# Patient Record
Sex: FEMALE | Race: WHITE
[De-identification: names, ages, dates, MRNs, and addresses within clinical notes are randomized per-mention and may not be internally consistent; named-entity substitution may affect disease eponyms.]

---

## 2017-08-25 ENCOUNTER — HOSPITAL ENCOUNTER (OUTPATIENT)
Dept: HOSPITAL 74 - JER | Age: 57
Setting detail: OBSERVATION
LOS: 2 days | Discharge: HOME | End: 2017-08-27
Attending: INTERNAL MEDICINE | Admitting: INTERNAL MEDICINE
Payer: COMMERCIAL

## 2017-08-25 VITALS — BODY MASS INDEX: 32.2 KG/M2

## 2017-08-25 DIAGNOSIS — E78.5: ICD-10-CM

## 2017-08-25 DIAGNOSIS — D72.829: ICD-10-CM

## 2017-08-25 DIAGNOSIS — K21.9: ICD-10-CM

## 2017-08-25 DIAGNOSIS — Z87.19: ICD-10-CM

## 2017-08-25 DIAGNOSIS — N11.1: Primary | ICD-10-CM

## 2017-08-25 LAB
ALBUMIN SERPL-MCNC: 3.9 G/DL (ref 3.4–5)
ALP SERPL-CCNC: 138 U/L (ref 45–117)
ALT SERPL-CCNC: 43 U/L (ref 12–78)
ANION GAP SERPL CALC-SCNC: 7 MMOL/L (ref 8–16)
APPEARANCE UR: (no result)
AST SERPL-CCNC: 24 U/L (ref 15–37)
BASOPHILS # BLD: 0.1 % (ref 0–2)
BILIRUB SERPL-MCNC: 0.9 MG/DL (ref 0.2–1)
BILIRUB UR STRIP.AUTO-MCNC: NEGATIVE MG/DL
CALCIUM SERPL-MCNC: 9.4 MG/DL (ref 8.5–10.1)
CAOX CRY URNS QL MICRO: (no result) /HPF
CO2 SERPL-SCNC: 27 MMOL/L (ref 21–32)
COLOR UR: YELLOW
CREAT SERPL-MCNC: 0.7 MG/DL (ref 0.55–1.02)
DEPRECATED RDW RBC AUTO: 14.3 % (ref 11.6–15.6)
EOSINOPHIL # BLD: 1 % (ref 0–4.5)
GLUCOSE SERPL-MCNC: 107 MG/DL (ref 74–106)
HYALINE CASTS URNS QL MICRO: 6 /LPF
KETONES UR QL STRIP: NEGATIVE
LEUKOCYTE ESTERASE UR QL STRIP.AUTO: (no result)
MCH RBC QN AUTO: 26.9 PG (ref 25.7–33.7)
MCHC RBC AUTO-ENTMCNC: 32.9 G/DL (ref 32–36)
MCV RBC: 81.7 FL (ref 80–96)
MUCOUS THREADS URNS QL MICRO: (no result)
NEUTROPHILS # BLD: 87.1 % (ref 42.8–82.8)
NITRITE UR QL STRIP: NEGATIVE
PH UR: 5 [PH] (ref 5–8)
PLATELET # BLD AUTO: 251 K/MM3 (ref 134–434)
PMV BLD: 8.5 FL (ref 7.5–11.1)
PROT SERPL-MCNC: 7.3 G/DL (ref 6.4–8.2)
PROT UR QL STRIP: NEGATIVE
PROT UR QL STRIP: NEGATIVE
RBC # BLD AUTO: (no result) /HPF (ref 0–3)
RBC # UR STRIP: NEGATIVE /UL
SP GR UR: 1.02 (ref 1–1.02)
UROBILINOGEN UR STRIP-MCNC: NEGATIVE MG/DL (ref 0.2–1)
WBC # BLD AUTO: 18.7 K/MM3 (ref 4–10)
WBC # UR AUTO: 31 /HPF (ref 3–5)

## 2017-08-25 PROCEDURE — G0378 HOSPITAL OBSERVATION PER HR: HCPCS

## 2017-08-25 RX ADMIN — SODIUM CHLORIDE STA MLS/HR: 9 INJECTION, SOLUTION INTRAVENOUS at 21:52

## 2017-08-25 NOTE — PDOC
History of Present Illness





- General


Chief Complaint: Pain


Stated Complaint: PAIN, ACUTE


Time Seen by Provider: 08/25/17 19:34


History Source: Patient


Exam Limitations: No Limitations





- History of Present Illness


Initial Comments: 





08/25/17 23:55


08/21/2017: Colonoscopy





56-year-old female presents to the emergency department complaining of 4/10 

dull intermittent left-sided flank pain radiating to the left groin 6 hours 

without nausea/vomiting, fever/chills, chest pain, shortness of breath, 

abdominal pains, urinary symptoms: Frequency/urgency/hesitancy, hematuria. 

There are no alleviating or exacerbating factors.


Timing/Duration: 4-6 hours





Past History





- Past Medical History


Allergies/Adverse Reactions: 


 Allergies











Allergy/AdvReac Type Severity Reaction Status Date / Time


 


No Known Allergies Allergy   Verified 04/23/16 12:49











Home Medications: 


Ambulatory Orders





Atorvastatin Ca [Lipitor] 40 mg PO HS 05/12/14 


Albuterol Sulfate Inhaler - [Ventolin HFA Inhaler -] 1 - 2 inh PO QID PRN 09/30/ 14 


Linaclotide [Linzess] 72 mcg PO DAILY 08/25/17 


Metronidazole 250 mg PO TID 08/25/17 


Ranitidine HCl 300 mg PO DAILY 08/25/17 








Anemia: No


Asthma: Yes (mild)


Cancer: No


Cardiac Disorders: No


CVA: No


COPD: No


CHF: No


Dementia: No


Diabetes: No


GI Disorders: Yes (acid reflux, Irritable Bowel Syndrome)


 Disorders: No


HTN: No


Hypercholesterolemia: Yes


Liver Disease: No


Seizures: No


Thyroid Disease: No





- Surgical History


Abdominal Surgery: No


Appendectomy: No


Cardiac Surgery: No


Cholecystectomy: No


Gastric Stapling: No


Lung Surgery: No


Neurologic Surgery: No


Orthopedic Surgery: No





- Psycho/Social/Smoking Cessation Hx


Anxiety: No


Suicidal Ideation: No


Smoking History: Never smoked


Have you smoked in the past 12 months: No


If you are a former smoker, when did you quit?: 2010


Information on smoking cessation initiated: No


Hx Alcohol Use: No


Drug/Substance Use Hx: No


Substance Use Type: None


Hx Substance Use Treatment: No





*Physical Exam





- Vital Signs


 Last Vital Signs











Temp Pulse Resp BP Pulse Ox


 


 98.0 F   78   18   140/74   99 


 


 08/25/17 19:23  08/25/17 19:23  08/25/17 19:23  08/25/17 19:23  08/25/17 19:23














ED Treatment Course





- LABORATORY


CBC & Chemistry Diagram: 


 08/25/17 19:40





 08/25/17 19:40





*DC/Admit/Observation/Transfer


Diagnosis at time of Disposition: 


 Pyelonephritis





- Discharge Dispostion


Condition at time of disposition: Guarded


Admit: Yes





- Referrals


Referrals: 


Kenji Low MD [Primary Care Provider] -

## 2017-08-25 NOTE — PDOC
*Physical Exam





- Vital Signs


 Last Vital Signs











Temp Pulse Resp BP Pulse Ox


 


 98.0 F   78   18   140/74   99 


 


 08/25/17 19:23  08/25/17 19:23  08/25/17 19:23  08/25/17 19:23  08/25/17 19:23














ED Treatment Course





- LABORATORY


CBC & Chemistry Diagram: 


 08/26/17 09:57





 08/26/17 09:57





- ADDITIONAL ORDERS


Additional order review: 


 Laboratory  Results











  08/25/17 08/25/17





  21:20 19:40


 


Sodium   137


 


Potassium   3.7


 


Chloride   103


 


Carbon Dioxide   27


 


Anion Gap   7 L


 


BUN   18  D


 


Creatinine   0.7


 


Creat Clearance w eGFR   > 60


 


Random Glucose   107 H D


 


Calcium   9.4


 


Total Bilirubin   0.9  D


 


AST   24


 


ALT   43


 


Alkaline Phosphatase   138 H


 


Total Protein   7.3


 


Albumin   3.9


 


Urine Color  Yellow 


 


Urine Appearance  Slcloudy 


 


Urine pH  5.0 


 


Urine Protein  Negative 


 


Urine Glucose (UA)  Negative 


 


Urine Ketones  Negative 


 


Urine Blood  Negative 


 


Urine Nitrite  Negative 


 


Urine Bilirubin  Negative 


 


Urine Urobilinogen  Negative 


 


Ur Leukocyte Esterase  Trace 


 


Urine RBC  None 


 


Urine WBC  31 


 


Calcium Oxalate Crystal  Moderate 


 


Hyaline Casts  6 


 


Urine Mucus  Many 








 











  08/25/17





  19:40


 


RBC  4.80


 


MCV  81.7


 


MCHC  32.9


 


RDW  14.3


 


MPV  8.5


 


Neutrophils %  87.1 H D


 


Lymphocytes %  6.9 L D


 


Monocytes %  4.9


 


Eosinophils %  1.0


 


Basophils %  0.1














- Medications


Given in the ED: 


ED Medications














Discontinued Medications














Generic Name Dose Route Start Last Admin





  Trade Name Freq  PRN Reason Stop Dose Admin


 


Sodium Chloride  1,000 mls @ 1,000 mls/hr 08/25/17 20:40 08/25/17 21:52





  Normal Saline -  IV 08/25/17 21:39  1,000 mls/hr





  ASDIR STA   Administration














Medical Decision Making





- Medical Decision Making





08/25/17 22:30





Pt seen by the Advanced Practice Provider under my direct supervision


Ancillary studies reviewed





I agree with plan as outlined by the Advanced Practice Provider ARACELI Arenas





*DC/Admit/Observation/Transfer


Diagnosis at time of Disposition: 


 Pyelonephritis





- Discharge Dispostion


Condition at time of disposition: Guarded

## 2017-08-26 LAB
ANION GAP SERPL CALC-SCNC: 8 MMOL/L (ref 8–16)
BASOPHILS # BLD: 2.6 % (ref 0–2)
CALCIUM SERPL-MCNC: 9 MG/DL (ref 8.5–10.1)
CO2 SERPL-SCNC: 27 MMOL/L (ref 21–32)
CREAT SERPL-MCNC: 0.6 MG/DL (ref 0.55–1.02)
DEPRECATED RDW RBC AUTO: 14.4 % (ref 11.6–15.6)
EOSINOPHIL # BLD: 2.5 % (ref 0–4.5)
GLUCOSE SERPL-MCNC: 108 MG/DL (ref 74–106)
MCH RBC QN AUTO: 26.9 PG (ref 25.7–33.7)
MCHC RBC AUTO-ENTMCNC: 33.3 G/DL (ref 32–36)
MCV RBC: 81 FL (ref 80–96)
NEUTROPHILS # BLD: 76.2 % (ref 42.8–82.8)
PLATELET # BLD AUTO: 255 K/MM3 (ref 134–434)
PMV BLD: 8.1 FL (ref 7.5–11.1)
WBC # BLD AUTO: 10.3 K/MM3 (ref 4–10)

## 2017-08-26 PROCEDURE — 3E03329 INTRODUCTION OF OTHER ANTI-INFECTIVE INTO PERIPHERAL VEIN, PERCUTANEOUS APPROACH: ICD-10-PCS | Performed by: INTERNAL MEDICINE

## 2017-08-26 PROCEDURE — 3E0337Z INTRODUCTION OF ELECTROLYTIC AND WATER BALANCE SUBSTANCE INTO PERIPHERAL VEIN, PERCUTANEOUS APPROACH: ICD-10-PCS | Performed by: INTERNAL MEDICINE

## 2017-08-26 RX ADMIN — CEFTRIAXONE SCH MLS/HR: 1 INJECTION, POWDER, FOR SOLUTION INTRAMUSCULAR; INTRAVENOUS at 19:58

## 2017-08-26 RX ADMIN — RANITIDINE SCH MG: 150 TABLET ORAL at 09:06

## 2017-08-26 RX ADMIN — Medication SCH MCG: at 11:21

## 2017-08-26 NOTE — HP
CHIEF COMPLAINT: "My left side is hurting."





PCP: [facility]





HISTORY OF PRESENT ILLNESS: This is a 55yo woman presents to the emergency 

department complaining of 4/10 dull intermittent left-sided flank pain 

radiating to the left groin 6 hours with frequency and urgency without nausea/

vomiting, fever/chills, chest pain, shortness of breath, abdominal pains, 

urinary symptoms: hesitancy, hematuria. There are no alleviating or 

exacerbating factors.








ER course was notable for:


(1) pyuria


(2) Leukocytosis








Recent Travel: denies





PAST MEDICAL HISTORY: IBS, GERD, HLD





PAST SURGICAL HISTORY: denies





Social History:


Smoking: former- quit 5-6 years ago. 20 pack years prior


Alcohol: occasional


Drugs: occasional THC





Family History: father and mother  of "heart failure" 1 brother and 1 

sister alive and healthy


Allergies





No Known Allergies Allergy (Verified 16 12:49)


 








HOME MEDICATIONS:


 Home Medications











 Medication  Instructions  Recorded


 


Atorvastatin Ca [Lipitor] 40 mg PO HS 14


 


Albuterol Sulfate Inhaler - 1 - 2 inh PO QID PRN 14





[Ventolin HFA Inhaler -]  


 


Linaclotide [Linzess] 72 mcg PO DAILY 17


 


Metronidazole 250 mg PO TID 17


 


Ranitidine HCl 300 mg PO DAILY 17








REVIEW OF SYSTEMS


CONSTITUTIONAL: 


Absent:  fever, chills, diaphoresis, generalized weakness, malaise, loss of 

appetite, weight change


HEENT: 


Absent:  rhinorrhea, nasal congestion, throat pain, throat swelling, difficulty 

swallowing, mouth swelling, ear pain, eye pain, visual changes


CARDIOVASCULAR: 


Absent: chest pain, syncope, palpitations, irregular heart rate, lightheadedness

, peripheral edema


RESPIRATORY: 


Absent: cough, shortness of breath, dyspnea with exertion, orthopnea, wheezing, 

stridor, hemoptysis


GASTROINTESTINAL:


Absent: abdominal pain, abdominal distension, nausea, vomiting, diarrhea, 

constipation, melena, hematochezia


GENITOURINARY: Present- frequency, urgency, left flank pain


Absent: dysuria, hesitancy, hematuria, genital pain


MUSCULOSKELETAL: 


Absent: myalgia, arthralgia, joint swelling, back pain, neck pain


SKIN: 


Absent: rash, itching, pallor


HEMATOLOGIC/IMMUNOLOGIC: 


Absent: easy bleeding, easy bruising, lymphadenopathy, frequent infections


ENDOCRINE:


Absent: unexplained weight gain, unexplained weight loss, heat intolerance, 

cold intolerance


NEUROLOGIC: 


Absent: headache, focal weakness or paresthesias, dizziness, unsteady gait, 

seizure, mental status changes, bladder or bowel incontinence


PSYCHIATRIC: 


Absent: anxiety, depression, suicidal or homicidal ideation, hallucinations.








PHYSICAL EXAMINATION


 Vital Signs - 24 hr











  17





  01:59


 


Temperature 98.1 F


 


Pulse Rate [ 71





Apical] 


 


Respiratory 18





Rate 


 


Blood Pressure 151/76





[Right Arm] 


 


O2 Sat by Pulse 99





Oximetry (%) 











GENERAL: Awake, alert, and fully oriented, in no acute distress.


HEAD: Normal with no signs of trauma.


EYES: Pupils equal, round and reactive to light, extraocular movements intact, 

sclera anicteric, conjunctiva clear. No lid lag.


EARS, NOSE, THROAT: Ears normal, nares patent, oropharynx clear without 

exudates. Moist mucous membranes.


NECK: Normal range of motion, supple without lymphadenopathy, JVD, or masses.


LUNGS: Breath sounds equal, clear to auscultation bilaterally. No wheezes, and 

no crackles. No accessory muscle use.


HEART: Regular rate and rhythm, normal S1 and S2 without murmur, rub or gallop.


ABDOMEN: Soft, nontender, not distended, normoactive bowel sounds, no guarding, 

no rebound, no masses.  No hepatomegaly or splenomegaly. 


MUSCULOSKELETAL: Normal range of motion at all joints. No bony deformities or 

tenderness. Left CVA tenderness present; right absent.


UPPER EXTREMITIES: 2+ pulses, warm, well-perfused. No cyanosis. No clubbing. No 

peripheral edema.


LOWER EXTREMITIES: 2+ pulses, warm, well-perfused. No calf tenderness. No 

peripheral edema. 


NEUROLOGICAL:  Cranial nerves II-XII intact. Normal speech. Normal gait.


PSYCHIATRIC: Cooperative. Good eye contact. Appropriate mood and affect.


SKIN: Warm, dry, normal turgor, no rashes or lesions noted, normal capillary 

refill. 





ASSESSMENT/PLAN:


A:


55 yo woman with pyelonephritis as evidenced by pyuria, flank pain and 

leukocytosis.





P:


1. Pyelonephritis


 - Rocephin 1g daily


 - daily CBC


 - urine cx pending





2. Leukocytosis


 - likely from pyelo


 - trend WBC





3. HLD


 - lipitor 40mg





4. IBS


 - Linzess


 - Flagyl 250mg po tid





5. GERD


 - Zantac 300mg daily





6. F/E/N


 - regular diet


 - encourage PO fluids


 - replete prn





7. PPX


 - OOB





Dispo- requires observation of her acute medical condition











Problem List





- Problem


(1) Pyelonephritis


Code(s): N12 - TUBULO-INTERSTITIAL NEPHRITIS, NOT SPCF AS ACUTE OR CHRONIC





(2) History of IBS


Code(s): Z87.19 - PERSONAL HISTORY OF OTHER DISEASES OF THE DIGESTIVE SYSTEM





(3) HLD (hyperlipidemia)


Code(s): E78.5 - HYPERLIPIDEMIA, UNSPECIFIED   





(4) Chronic GERD


Code(s): K21.9 - GASTRO-ESOPHAGEAL REFLUX DISEASE WITHOUT ESOPHAGITIS








Visit type





- Emergency Visit


Emergency Visit: Yes


ED Registration Date: 17


Care time: The patient presented to the Emergency Department on the above date 

and was hospitalized for further evaluation of their emergent condition.





- New Patient


This patient is new to me today: Yes


Date on this admission: 17





- Critical Care


Critical Care patient: No

## 2017-08-26 NOTE — PN
Progress Note (short form)





- Note


Progress Note: 


55 y/o female admitted for left sided flank pain and urgency and diagnosed with 

pyelonephritis





Exam:


GEN: A&Ox3 in NAD, afebrile


HEENT: NC, PERRLA, EOMI, oral mucosa moist and no lesions


CVS:RRR, S1, S2


LUNGS: CTA, no wheezing


ABD: Soft, NT, ND, BS+ , no organomegaly No CVA tenderness.


Ext:Nl ROM, no Edema


Neuro: CN2-12 intact








A/P:


 Pyelonephritis


IVF


continue ABx rocephine


Repeat CBC, BMP and lactic acid stat.


WBC trending down.





IBS continue home medications





Problem List





- Problems


(1) Pyelonephritis


Code(s): N12 - TUBULO-INTERSTITIAL NEPHRITIS, NOT SPCF AS ACUTE OR CHRONIC





(2) History of IBS


Code(s): Z87.19 - PERSONAL HISTORY OF OTHER DISEASES OF THE DIGESTIVE SYSTEM








Visit type





- Emergency Visit


Emergency Visit: Yes


ED Registration Date: 08/26/17


Care time: The patient presented to the Emergency Department on the above date 

and was hospitalized for further evaluation of their emergent condition.





- New Patient


This patient is new to me today: Yes


Date on this admission: 08/26/17





- Critical Care


Critical Care patient: No

## 2017-08-27 VITALS — SYSTOLIC BLOOD PRESSURE: 118 MMHG | TEMPERATURE: 98.3 F | DIASTOLIC BLOOD PRESSURE: 73 MMHG | HEART RATE: 80 BPM

## 2017-08-27 LAB
ANION GAP SERPL CALC-SCNC: 9 MMOL/L (ref 8–16)
BASOPHILS # BLD: 0.2 % (ref 0–2)
CALCIUM SERPL-MCNC: 9 MG/DL (ref 8.5–10.1)
CO2 SERPL-SCNC: 27 MMOL/L (ref 21–32)
CREAT SERPL-MCNC: 0.7 MG/DL (ref 0.55–1.02)
DEPRECATED RDW RBC AUTO: 14.2 % (ref 11.6–15.6)
EOSINOPHIL # BLD: 3.4 % (ref 0–4.5)
GLUCOSE SERPL-MCNC: 88 MG/DL (ref 74–106)
MCH RBC QN AUTO: 26.7 PG (ref 25.7–33.7)
MCHC RBC AUTO-ENTMCNC: 32.5 G/DL (ref 32–36)
MCV RBC: 82.1 FL (ref 80–96)
NEUTROPHILS # BLD: 74.8 % (ref 42.8–82.8)
PLATELET # BLD AUTO: 239 K/MM3 (ref 134–434)
PMV BLD: 8.7 FL (ref 7.5–11.1)
WBC # BLD AUTO: 10.3 K/MM3 (ref 4–10)

## 2017-08-27 RX ADMIN — Medication SCH MCG: at 09:18

## 2017-08-27 RX ADMIN — RANITIDINE SCH MG: 150 TABLET ORAL at 09:18

## 2017-08-27 RX ADMIN — CEFTRIAXONE SCH MLS/HR: 1 INJECTION, POWDER, FOR SOLUTION INTRAMUSCULAR; INTRAVENOUS at 09:18

## 2017-08-27 NOTE — DS
Physical Exam: 


SUBJECTIVE: Patient seen and examined, c/o pain in buttock area, no leg pain or 

numbness. Denies any fever or chills, no  increased frequency and or hematuria.








OBJECTIVE:





 Vital Signs











 Period  Temp  Pulse  Resp  BP Sys/Jones  Pulse Ox


 


 Last 24 Hr  97.8 F-99.0 F  71-80  18-20  106-138/55-75  








PHYSICAL EXAM





GENERAL: The patient is awake, alert, and fully oriented, in no acute distress.


HEAD: Normal with no signs of trauma.


EYES: PERRL, extraocular movements intact, sclera anicteric, conjunctiva clear. 


ENT: Ears normal, nares patent, oropharynx clear without exudates, moist mucous 

membranes.


NECK: Trachea midline, full range of motion, supple. 


LUNGS: Breath sounds equal, clear to auscultation bilaterally, no wheezes, no 

crackles, no accessory muscle use. 


HEART: Regular rate and rhythm, S1, S2 without murmur, rub or gallop.


ABDOMEN: Soft, nontender, nondistended, normoactive bowel sounds, no guarding, 

no rebound, no hepatosplenomegaly, no masses.


MSK: Lumbar sacral paraspinal tenderness.


EXTREMITIES: 2+ pulses, warm, well-perfused, no edema. 


NEUROLOGICAL: Cranial nerves II through XII grossly intact. Normal speech, gait 

not observed.


PSYCH: Normal mood, normal affect.


SKIN: Warm, dry, normal turgor, no rashes or lesions noted.





LABS


 Laboratory Results - last 24 hr











  08/27/17 08/27/17





  06:00 06:00


 


WBC  10.3 H 


 


RBC  4.64 


 


Hgb  12.4 


 


Hct  38.1 


 


MCV  82.1 


 


MCH  26.7 


 


MCHC  32.5 


 


RDW  14.2 


 


Plt Count  239 


 


MPV  8.7 


 


Neutrophils %  74.8 


 


Lymphocytes %  14.6 


 


Monocytes %  7.0 


 


Eosinophils %  3.4 


 


Basophils %  0.2 


 


Sodium   143


 


Potassium   4.1


 


Chloride   107


 


Carbon Dioxide   27


 


Anion Gap   9


 


BUN   15  D


 


Creatinine   0.7


 


Random Glucose   88


 


Calcium   9.0








 Current Medications











Generic Name Dose Route Start Last Admin





  Trade Name Freq  PRN Reason Stop Dose Admin


 


Acetaminophen  650 mg 08/26/17 03:30 08/26/17 11:20





  Tylenol -  PO   650 mg





  Q4H PRN   Administration





  FEVER OR PAIN   


 


Atorvastatin Calcium  40 mg 08/26/17 22:00 08/26/17 21:03





  Lipitor -  PO   40 mg





  HS NIKO   Administration


 


Ceftriaxone Sodium 1 gm/  50 mls @ 100 mls/hr 08/26/17 20:00 08/27/17 09:18





  Dextrose  IVPB   100 mls/hr





  DAILY NIKO   Administration


 


Metronidazole  250 mg 08/26/17 06:00 08/27/17 05:41





  Flagyl -  PO   250 mg





  TID NIKO   Administration


 


Non-Formulary Medication  72 mcg 08/26/17 10:00 08/27/17 09:18





  Linaclotide [Linzess]  PO   72 mcg





  DAILY NIKO   Administration


 


Ranitidine HCl  300 mg 08/26/17 10:00 08/27/17 09:18





  Zantac -  PO   300 mg





  DAILY NIKO   Administration














HOSPITAL COURSE:


Admitted for Pyelonephritis and given Ceftriaxone IVPB, WBC trended down. Will 

continue home medications and add levaquin  750mg q24h for 5days. Tylenol PRN 

for pain or fever.





Date of Admission:08/26/17





Date of Discharge: 08/27/17











Minutes to complete discharge: 40





Discharge Summary


Reason For Visit: PYELONEPHRITIS


Current Active Problems





Chronic GERD (Acute) 


HLD (hyperlipidemia) (Acute) 


History of IBS (Acute) 


Pyelonephritis (Acute) 








Hospital Course: 


Started on ceftriaxone , WBCs trended down and patient's symptoms were 

relieved. 


Continue Levaquin 750mg daily for 5 days


Condition: Good





- Instructions


Diet, Activity, Other Instructions: 


Plain yogurt daily


Referrals: 


Kenji Low MD [Primary Care Provider] - 


Disposition: HOME





- Home Medications


Comprehensive Discharge Medication List: 


Ambulatory Orders





Atorvastatin Ca [Lipitor] 40 mg PO HS 05/12/14 


Albuterol Sulfate Inhaler - [Ventolin HFA Inhaler -] 1 - 2 inh PO QID PRN 09/30/ 14 


Linaclotide [Linzess] 72 mcg PO DAILY 08/25/17 


Metronidazole 250 mg PO TID 08/25/17 


Ranitidine HCl 300 mg PO DAILY 08/25/17 











Problem List





- Problems


(1) Pyelonephritis


Code(s): N12 - TUBULO-INTERSTITIAL NEPHRITIS, NOT SPCF AS ACUTE OR CHRONIC





(2) History of IBS


Code(s): Z87.19 - PERSONAL HISTORY OF OTHER DISEASES OF THE DIGESTIVE SYSTEM





This patient is new to me today: Yes


Date on this admission: 08/27/17


Emergency Visit: Yes


ED Registration Date: 08/26/17


Care time: The patient presented to the Emergency Department on the above date 

and was hospitalized for further evaluation of their emergent condition.


Critical Care patient: No





- Discharge Referral


Referred to [facility] No

## 2017-09-24 ENCOUNTER — HOSPITAL ENCOUNTER (INPATIENT)
Dept: HOSPITAL 74 - JER | Age: 57
LOS: 2 days | Discharge: HOME | DRG: 694 | End: 2017-09-26
Attending: FAMILY MEDICINE | Admitting: FAMILY MEDICINE
Payer: COMMERCIAL

## 2017-09-24 VITALS — BODY MASS INDEX: 31.9 KG/M2

## 2017-09-24 DIAGNOSIS — N13.2: Primary | ICD-10-CM

## 2017-09-24 DIAGNOSIS — Z87.891: ICD-10-CM

## 2017-09-24 DIAGNOSIS — E78.5: ICD-10-CM

## 2017-09-24 DIAGNOSIS — D72.829: ICD-10-CM

## 2017-09-24 DIAGNOSIS — K21.9: ICD-10-CM

## 2017-09-24 LAB
ALBUMIN SERPL-MCNC: 3.6 G/DL (ref 3.4–5)
ALP SERPL-CCNC: 154 U/L (ref 45–117)
ALT SERPL-CCNC: 40 U/L (ref 12–78)
ANION GAP SERPL CALC-SCNC: 8 MMOL/L (ref 8–16)
APPEARANCE UR: CLEAR
AST SERPL-CCNC: 27 U/L (ref 15–37)
BASOPHILS # BLD: 0.2 % (ref 0–2)
BILIRUB SERPL-MCNC: 0.5 MG/DL (ref 0.2–1)
BILIRUB UR STRIP.AUTO-MCNC: NEGATIVE MG/DL
CALCIUM SERPL-MCNC: 9 MG/DL (ref 8.5–10.1)
CO2 SERPL-SCNC: 26 MMOL/L (ref 21–32)
COLOR UR: YELLOW
CREAT SERPL-MCNC: 0.8 MG/DL (ref 0.55–1.02)
DEPRECATED RDW RBC AUTO: 14.3 % (ref 11.6–15.6)
EOSINOPHIL # BLD: 1.5 % (ref 0–4.5)
GLUCOSE SERPL-MCNC: 152 MG/DL (ref 74–106)
KETONES UR QL STRIP: NEGATIVE
LEUKOCYTE ESTERASE UR QL STRIP.AUTO: NEGATIVE
MCH RBC QN AUTO: 26.4 PG (ref 25.7–33.7)
MCHC RBC AUTO-ENTMCNC: 32.5 G/DL (ref 32–36)
MCV RBC: 81.2 FL (ref 80–96)
NEUTROPHILS # BLD: 87.3 % (ref 42.8–82.8)
NITRITE UR QL STRIP: NEGATIVE
PH UR: 5 [PH] (ref 5–8)
PLATELET # BLD AUTO: 259 K/MM3 (ref 134–434)
PMV BLD: 7.9 FL (ref 7.5–11.1)
PROT SERPL-MCNC: 6.8 G/DL (ref 6.4–8.2)
PROT UR QL STRIP: NEGATIVE
PROT UR QL STRIP: NEGATIVE
RBC # UR STRIP: NEGATIVE /UL
UROBILINOGEN UR STRIP-MCNC: NEGATIVE MG/DL (ref 0.2–1)
WBC # BLD AUTO: 17.5 K/MM3 (ref 4–10)

## 2017-09-24 PROCEDURE — G0008 ADMIN INFLUENZA VIRUS VAC: HCPCS

## 2017-09-24 NOTE — PDOC
History of Present Illness





- General


History Source: Patient


Exam Limitations: No Limitations





- History of Present Illness


Initial Comments: 





09/24/17 21:26


The patient is a 57 year old female with a significant past medical history of 

recurrent UtIs, acid reflux, IBS, and HLD who presents to the ED with 

complaints of superpubic pain since earlier today. The patient reports a sudden 

onset of superpubic pain around 6:30 pm earlier today. She states her pain 

progressively worsened and her superpubic pain is now radiating to her left 

flank. Patient also reports one episode of vomiting associated with presents 

symptoms. 


Patient states she was recently prescribed Cipro on Tuesday for a UTI. Patient 

was recently admitted to the hospital on 8/25/17 for pyelonephritis. 


Denies fevers or chills. Denies chest pain or shortness of breath. Denies 

headache, lightheadedness, or dizziness. Denies dysuria or changes in urinary 

output. Denies any other symptoms. 





PCP: Dr. Low 


GL: Dr. Butler








<Trudy Lucio - Last Filed: 09/24/17 23:52>





<Radha Mcnamara - Last Filed: 09/25/17 01:08>





- General


Chief Complaint: Pain


Stated Complaint: PAIN


Time Seen by Provider: 09/24/17 20:27





Past History





<Trudy Lucio - Last Filed: 09/24/17 23:52>





- Past Medical History


Anemia: No


Asthma: Yes (mild)


Cancer: No


Cardiac Disorders: No


CVA: No


COPD: No


CHF: No


Dementia: No


Diabetes: No


GI Disorders: Yes (acid reflux, Irritable Bowel Syndrome)


 Disorders: Yes (UTIs)


HTN: No


Hypercholesterolemia: Yes


Liver Disease: No


Seizures: No


Thyroid Disease: No





- Surgical History


Abdominal Surgery: No


Appendectomy: No


Cardiac Surgery: No


Cholecystectomy: No


Gastric Stapling: No


Lung Surgery: No


Neurologic Surgery: No


Orthopedic Surgery: No





- Suicide/Smoking/Psychosocial Hx


Smoking History: Never smoked


Have you smoked in the past 12 months: No


Number of Cigarettes Smoked Daily: 0


If you are a former smoker, when did you quit?: 2010


Information on smoking cessation initiated: No


Hx Alcohol Use: No


Drug/Substance Use Hx: No


Substance Use Type: None


Hx Substance Use Treatment: No





<Radha Mcnamara - Last Filed: 09/25/17 01:08>





- Past Medical History


Allergies/Adverse Reactions: 


 Allergies











Allergy/AdvReac Type Severity Reaction Status Date / Time


 


No Known Allergies Allergy   Verified 09/24/17 20:09











Home Medications: 


Ambulatory Orders





Atorvastatin Ca [Lipitor] 40 mg PO HS 05/12/14 


Albuterol Sulfate Inhaler - [Ventolin HFA Inhaler -] 1 - 2 inh PO QID PRN 09/30/ 14 


Linaclotide [Linzess] 72 mcg PO DAILY 08/25/17 


Metronidazole 250 mg PO TID 08/25/17 


Ranitidine HCl 300 mg PO DAILY 08/25/17 











**Review of Systems





- Review of Systems


Able to Perform ROS?: Yes


Comments:: 





09/24/17 21:26





CONSTITUTIONAL:


No reported: Fever, Chills, Diaphoresis, Generalized Weakness, Malaise, Loss of 

Appetite


HEENT:


No reported: Rhinorrhea, Nasal Congestion, Throat Pain, Throat Swelling, 

Difficulty Swallowing, Mouth Swelling, Ear Pain, Eye Pain, Visual Changes


CARDIOVASCULAR:


No reported: Chest Pain, Syncope, Palpitations, Irregular Heart Rate, 

Lightheadedness, Peripheral Edema


RESPIRATORY:


No reported: Cough, Shortness of Breath, SOB with Exertion, Orthopnea, Wheezing

, Stridor, Hemoptysis


GASTROINTESTINAL:  + vomiting 


No reported: Abdominal pain, Abdominal Distension, Diarrhea, Constipation, 

Melena, Hematochezia


GENITOURINARY:+ superpubic pain, flank pain


No reported: Dysuria, Frequency, Urgency, Hesitancy,, Genital Pain


MUSCULOSKELETAL:


No reported: Myalgia, Arthralgia, Joint Swelling, Back pain, Neck Pain


SKIN:


No reported: Rash, Itching, Pallor


HEMEATOLOGIC/IMMUNOLOGIC:


No reported: Easy Bleeding, Easy Bruising, Lymphadenopathy, Frequent infections


ENDOCRINE:


No reported: Unexplained Weight Gain, Unexplained Weight Loss, Heat Intolerance

, Cold Intolerance


NEUROLOGIC:


No reported: Headache, Focal Weakness, Paresthesias, Vertigo, Lightheadedness, 

Unsteady Gait, Seizure, Mental Status Changes, Incontinence


PSYCHIATRIC:


No reported: Anxiety, Depression 








All Other Systems: Reviewed and Negative





<Trudy Lucio - Last Filed: 09/24/17 23:52>





*Physical Exam





- Vital Signs


 Last Vital Signs











Temp Pulse Resp BP Pulse Ox


 


 97.9 F   86   20   200/149   97 


 


 09/24/17 20:10  09/24/17 20:10  09/24/17 20:10  09/24/17 20:10  09/24/17 20:10














- Physical Exam


Comments: 





09/24/17 21:26





GENERAL:


Well developed, well nourished. Awake and alert. No acute distress.


HEENT:


Normocephalic, atraumatic. PERRLA, EOMI. No conjunctival pallor. Sclera are non-

icteric. Moist mucous membranes. Oropharynx is clear.


NECK: 


Supple. Full ROM. No JVD. Carotid pulses 2+ and symmetric, without bruits. No 

thyromegaly. No lymphadenopathy.


CARDIOVASCULAR:


Regular rate and rhythm. No murmurs, rubs, or gallops. Distal pulses are 2+ and 

symmetric. 


PULMONARY: 


No evidence of respiratory distress. Lungs clear to auscultation bilaterally. 

No wheezing, rales or rhonchi.


ABDOMINAL:+ Superpubic discomfort 


Soft. Non-tender. Non-distended. No rebound or guarding. No organomegaly. 

Normoactive bowel sounds. 


MUSCULOSKELETAL 


Normal range of motion at all joints. No bony deformities or tenderness. No CVA 

tenderness.


EXTREMITIES: 


No cyanosis. No clubbing. No calf tenderness. Full rom, no cellulitis, no 

pitting edema. 


SKIN: 


Warm and dry. Normal capillary refill. No rashes. No jaundice. 


NEUROLOGICAL: 


Alert, awake, appropriate. Cranial nerves 2-12 intact. No deficits to light 

touch and temperature in face, upper extremities and lower extremities. No 

motor deficits in the in face, upper extremities and lower extremities. 

Normoreflexic in the upper and lower extremities. Normal speech. Toes are down-

going bilaterally. Gait is normal without ataxia.


PSYCHIATRIC: 


Cooperative. Good eye contact. Appropriate mood and affect.





<Trudy Lucio - Last Filed: 09/24/17 23:52>





- Vital Signs


 Last Vital Signs











Temp Pulse Resp BP Pulse Ox


 


 97.9 F   86   20   200/149   97 


 


 09/24/17 20:10  09/24/17 20:10  09/24/17 20:10  09/24/17 20:10  09/24/17 20:10














<Radha Mcnamara - Last Filed: 09/25/17 01:08>





ED Treatment Course





- LABORATORY


CBC & Chemistry Diagram: 


 09/24/17 22:15





 09/24/17 22:15





- ADDITIONAL ORDERS


Additional order review: 


 Laboratory  Results











  09/24/17





  20:20


 


Urine Color  Yellow


 


Urine Appearance  Clear


 


Urine pH  5.0


 


Urine Protein  Negative


 


Urine Glucose (UA)  Negative


 


Urine Ketones  Negative


 


Urine Blood  Negative


 


Urine Nitrite  Negative


 


Urine Bilirubin  Negative


 


Urine Urobilinogen  Negative














- RADIOLOGY


Radiograph Interpretation: 





09/24/17 23:52


EXAM: CT abdomen and pelvis without contrast


IMPRESSION:


Mild left hydronephrosis secondary to a 1 mm distal UVJ stone, possible 

ruptured calyx


Reported by: Imaging on call 





<Trudy Lucio - Last Filed: 09/24/17 23:52>





- LABORATORY


CBC & Chemistry Diagram: 


 09/24/17 22:15





 09/24/17 22:15





- ADDITIONAL ORDERS


Additional order review: 


 Laboratory  Results











  09/24/17





  20:20


 


Urine Color  Yellow


 


Urine Appearance  Clear


 


Urine pH  5.0


 


Urine Protein  Negative


 


Urine Glucose (UA)  Negative


 


Urine Ketones  Negative


 


Urine Blood  Negative


 


Urine Nitrite  Negative


 


Urine Bilirubin  Negative


 


Urine Urobilinogen  Negative














<Radha Mcnamara - Last Filed: 09/25/17 01:08>





Medical Decision Making





- Medical Decision Making





09/25/17 01:00


57-year-old female presents with severe left flank pain radiating to her left 

groin that started suddenly today.  She vomited several times.  She is 

currently already on antibiotics for pyelonephritis.  She saw her primary 

doctor on Friday and was time she felt well.





She was admitted last month for pyelonephritis.  


Today she has a leukocytosis of 17,000,.  


Urinalysis is negative, but her CAT scan does show a 1 mm stone at the left UVJ 

with mild left hydronephrosis.


  There is concern that she might have a ruptured calyx.  Discussed CAT scan 

and lab results with Dr. Kenji Low and he wanted her admitted.  Dr. Ames 

is a urologist for consultation





<Radha Mcnamara - Last Filed: 09/25/17 01:08>





*DC/Admit/Observation/Transfer





- Attestations


Scribe Attestion: 





09/24/17 21:26





Documentation prepared by Trudy Lucio, acting as medical scribe for Radha Mcnamara MD





<Trudy Lucio - Last Filed: 09/24/17 23:52>





- Discharge Dispostion


Admit: Yes





<Radha Mcnamara - Last Filed: 09/25/17 01:08>


Diagnosis at time of Disposition: 


 Kidney stone on left side





Hydronephrosis


Qualifiers:


 Hydronephrosis type: unspecified Qualified Code(s): N13.30 - Unspecified 

hydronephrosis





Vomiting


Qualifiers:


 Vomiting type: unspecified Vomiting Intractability: non-intractable Nausea 

presence: with nausea Qualified Code(s): R11.2 - Nausea with vomiting, 

unspecified





- Discharge Dispostion


Condition at time of disposition: Stable





- Referrals


Referrals: 


Anders Mitchell MD [Primary Care Provider] -

## 2017-09-25 LAB
ALBUMIN SERPL-MCNC: 3.4 G/DL (ref 3.4–5)
ALP SERPL-CCNC: 135 U/L (ref 45–117)
ALT SERPL-CCNC: 34 U/L (ref 12–78)
ANION GAP SERPL CALC-SCNC: 11 MMOL/L (ref 8–16)
AST SERPL-CCNC: 19 U/L (ref 15–37)
BASOPHILS # BLD: 0.2 % (ref 0–2)
BILIRUB SERPL-MCNC: 0.8 MG/DL (ref 0.2–1)
CALCIUM SERPL-MCNC: 9.2 MG/DL (ref 8.5–10.1)
CO2 SERPL-SCNC: 25 MMOL/L (ref 21–32)
CREAT SERPL-MCNC: 0.6 MG/DL (ref 0.55–1.02)
DEPRECATED RDW RBC AUTO: 14.3 % (ref 11.6–15.6)
EOSINOPHIL # BLD: 2.8 % (ref 0–4.5)
GLUCOSE SERPL-MCNC: 94 MG/DL (ref 74–106)
MCH RBC QN AUTO: 26.7 PG (ref 25.7–33.7)
MCHC RBC AUTO-ENTMCNC: 32.6 G/DL (ref 32–36)
MCV RBC: 81.8 FL (ref 80–96)
NEUTROPHILS # BLD: 75.3 % (ref 42.8–82.8)
PLATELET # BLD AUTO: 241 K/MM3 (ref 134–434)
PMV BLD: 8 FL (ref 7.5–11.1)
PROT SERPL-MCNC: 6.2 G/DL (ref 6.4–8.2)
SP GR UR: >= 1.03 (ref 1–1.02)
WBC # BLD AUTO: 12.3 K/MM3 (ref 4–10)

## 2017-09-25 RX ADMIN — PIPERACILLIN SODIUM,TAZOBACTAM SODIUM SCH: 3; .375 INJECTION, POWDER, FOR SOLUTION INTRAVENOUS at 10:15

## 2017-09-25 RX ADMIN — POTASSIUM CHLORIDE, DEXTROSE MONOHYDRATE AND SODIUM CHLORIDE SCH MLS/HR: 150; 5; 450 INJECTION, SOLUTION INTRAVENOUS at 14:01

## 2017-09-25 RX ADMIN — CEFTRIAXONE SCH MLS/HR: 1 INJECTION, POWDER, FOR SOLUTION INTRAMUSCULAR; INTRAVENOUS at 11:13

## 2017-09-25 RX ADMIN — POTASSIUM CHLORIDE, DEXTROSE MONOHYDRATE AND SODIUM CHLORIDE SCH MLS/HR: 150; 5; 450 INJECTION, SOLUTION INTRAVENOUS at 01:52

## 2017-09-25 RX ADMIN — HEPARIN SODIUM SCH UNIT: 5000 INJECTION, SOLUTION INTRAVENOUS; SUBCUTANEOUS at 22:32

## 2017-09-25 RX ADMIN — POTASSIUM CHLORIDE, DEXTROSE MONOHYDRATE AND SODIUM CHLORIDE SCH MLS/HR: 150; 5; 450 INJECTION, SOLUTION INTRAVENOUS at 22:34

## 2017-09-25 RX ADMIN — PIPERACILLIN SODIUM,TAZOBACTAM SODIUM SCH GM: 3; .375 INJECTION, POWDER, FOR SOLUTION INTRAVENOUS at 01:52

## 2017-09-25 RX ADMIN — RANITIDINE SCH MG: 150 TABLET ORAL at 11:03

## 2017-09-25 NOTE — CON.ID
Consult


Consult Specialty:: Infectious Disease


Reason for Consultation:: Possible Pyelonephritis





- History of Present Illness


Chief Complaint: Suprapubic pain with radiation to L flank


History of Present Illness: 


56yo F with history of HLD and previous hospital admission on 8/25/17 for 

pyelonephritis who presents to this facility for sudden onset of severe 

suprapubic pain with associated radiation to her L flank. She states that last 

night she was sitting and relaxing when she started to develop her severe 

suprapubic pain. She reports it being similar in character when compared to her 

8/25/17 pain, however this was much more severe. Pt also reports an inability 

to produce an adequate amount of urine when trying to void. Pt reports being 

compliant on Ciprofloxacin since Tuesday 9/19/17 per Dr. Low's office for 

treatment of her elevated white count alongside history of urinary symptoms. In 

the ER, she given a one-time Zosyn 3.375 dose, Abdominal CT was performed, and 

urology was also consulted.


Currently, pt continues to have suprapubic pain with radiation to L flank and 

had her first adequate volume of urine produced without note of blood, mucus, 

or cola-coloured urine. Denies any fevers/chills, nausea/vomiting, diarrhea/

constipation, SOB, CP/discomfort, and dysuria.





PCP: Dr. Low





- History Source


History Provided By: Patient


Limitations to Obtaining History: No Limitations





- Past Medical History


Cardio/Vascular: Yes: Hyperlipdemia


Gastrointestinal: Yes: Irritable Bowel Disease


Renal/: Yes: UTI (previously admitted for pyelonephritis 8/25/17)


Endocrine: No: Diabetes Mellitus





- Past Surgical History


Past Surgical History: Yes: Tonsillectomy





- Alcohol/Substance Use


Hx Alcohol Use: No


History of Substance Use: reports: None





- Smoking History


Smoking history: Former smoker


Have you smoked in the past 12 months: No


Aproximately how many cigarettes per day: 10


If you are a former smoker, when did you quit?: 2012





- Social History


Usual Living Arrangement: Alone


History of Recent Travel: No





Home Medications





- Allergies


Allergies/Adverse Reactions: 


 Allergies











Allergy/AdvReac Type Severity Reaction Status Date / Time


 


No Known Allergies Allergy   Verified 09/24/17 20:09














- Home Medications


Home Medications: 


Ambulatory Orders





RX: Atorvastatin Ca [Lipitor] 40 mg PO HS 05/12/14 


Albuterol Sulfate Inhaler - [Ventolin HFA Inhaler -] 1 - 2 inh PO QID PRN 09/30/ 14 


Linaclotide [Linzess] 72 mcg PO DAILY 08/25/17 


RX: Metronidazole 250 mg PO TID 08/25/17 


RX: Ranitidine HCl 300 mg PO DAILY 08/25/17 











Physical Exam


Vital Signs: 


 Vital Signs











Temperature  97.3 F L  09/25/17 08:53


 


Pulse Rate  73   09/25/17 08:53


 


Respiratory Rate  17 09/25/17 08:53


 


Blood Pressure  102/56   09/25/17 08:53


 


O2 Sat by Pulse Oximetry (%)  97   09/25/17 04:15











Constitutional: Yes: Well Nourished, No Distress, Calm


Eyes: Yes: EOM Intact, PERRL


Respiratory: Yes: Regular, CTA Bilaterally.  No: Rhonchi, SOB, Wheezes


Gastrointestinal: Yes: Normal Bowel Sounds, Soft, Tenderness (Suprapubic 

tenderness).  No: Distention, Tenderness, Rebound


Renal/: Yes: CVA Tenderness - Right.  No: [place] Present


Edema: No


Neurological: Yes: Alert, Oriented


Psychiatric: Yes: Alert, Oriented


Labs: 


 CBC, BMP





 09/24/17 22:15 





 09/25/17 06:00 











Imaging





- Results


Cat Scan: Report Reviewed, Image Reviewed (Left nephrolithiasis appreciated at 

UVJ. B/L kidney stranding possible calyx rupture vs inflammation by my read)





Assessment/Plan


Assessment :





Leukocytosis


Obstructing nephrolithiasis with hydronephros


R/O Kidney Calyx rupture





Plan:


Pt pain much improved from last night


Cultures pending; previously pre-treated with Ciprofloxacin 500mg PO on an 

outpatient basis


Trend WBC


Urology already consulted; will await plan


D/C Zosyn


Start Ceftriaxone 1gm IV qDaily

## 2017-09-25 NOTE — PN
Teaching Attending Note


Name of Resident: Rod Saldana


ATTENDING PHYSICIAN STATEMENT





I saw and evaluated the patient.


I reviewed the resident's note and discussed the case with the resident.


I agree with the resident's findings and plan as documented.








SUBJECTIVE:Patient seen and examined with resident








OBJECTIVE:








ASSESSMENT AND PLAN:


 Selected Entries











  09/25/17





  08:53


 


Temperature 97.3 F L


 


Pulse Rate 73


 


Respiratory 17





Rate 


 


Blood Pressure 102/56








Abd  Lower abd mostly RLQ pain no guarding rebound and right CVA pain


Microbiology








 Laboratory Tests











  09/24/17 09/24/17 09/25/17





  20:20 22:15 06:00


 


WBC   17.5 H D 


 


Hgb   12.2 


 


Plt Count   259 


 


BUN    17


 


Creatinine    0.6  D


 


Urine Nitrite  Negative  








Assessment Right kidney stone with leukocytosis ( on cipro PTA)





Plan


Urine culture


Ceftriaxone 1 gram daily


Urology eval ? stenting


Analgesics





Keiko HALL

## 2017-09-25 NOTE — DS
Physical Examination


Vital Signs: 


 Vital Signs











Temperature  97.3 F L  09/25/17 08:53


 


Pulse Rate  73   09/25/17 08:53


 


Respiratory Rate  17   09/25/17 08:53


 


Blood Pressure  102/56   09/25/17 08:53


 


O2 Sat by Pulse Oximetry (%)  97   09/25/17 04:15











Constitutional: Yes: Calm


Neck: Yes: Trachea Midline


Cardiovascular: Yes: Regular Rate and Rhythm, S1, S2


Respiratory: Yes: CTA Bilaterally


Gastrointestinal: Yes: Normal Bowel Sounds, Soft


Edema: No


Neurological: Yes: Alert, Oriented


Labs: 


 CBC, BMP





 09/25/17 13:24 





 09/25/17 06:00 











Discharge Summary


Reason For Visit: KIDNEY STONE ON (L) SIDE HYDRONEPHROSIS


Current Active Problems





Hydronephrosis (Acute) 


Kidney stone on left side (Acute) 


Leukocytosis (Acute) 


Vomiting (Acute) 








Hospital Course: 


PCP: Kenji Low





- Admission


Chief Complaint: left sided pain


History of Present Illness: 


The patient is a 57 year old female with a significant past medical history of 

recurrent UtIs, acid reflux, IBS, and HLD who presents to the ED with 

complaints of suprapubic pain since earlier yesterday. The patient reports a 

sudden onset of suprapubic  pain around 6:30 pm yesterday. She states her pain 

progressively worsened and her suprapubic pain is now radiating to her left 

flank. Patient also reports one episode of vomiting associated with presents 

symptoms. 


Patient states she was recently prescribed Cipro on Tuesday for a UTI. Patient 

was recently admitted to the hospital on 8/25/17 for pyelonephritis and got abx.


Denies fevers or chills. Denies chest pain or shortness of breath. Denies 

headache, lightheadedness, or dizziness. Denies dysuria or changes in urinary 

output. Denies any other symptoms. 





PCP: Dr. Low 


GL: Dr. Butler





in ER found leukocytosis of 17


ct scan shows stone at UVJ leftside and  mild left sided hydronephrosis


on iv abx


today wbc i s 12.3 trending down


seen by urology start eating if tolerates diet then dc home on po abx


Condition: Stable





- Instructions


Diet, Activity, Other Instructions: 


keflex 500mg po bid for 7 days


Referrals: 


Anders Mitchell MD [Primary Care Provider] - 


Disposition: HOME





- Home Medications


Comprehensive Discharge Medication List: 


Ambulatory Orders





Atorvastatin Ca [Lipitor] 40 mg PO HS 05/12/14 


Albuterol Sulfate Inhaler - [Ventolin HFA Inhaler -] 1 - 2 inh PO QID PRN 09/30/ 14 


Metronidazole 250 mg PO TID 08/25/17 


Ranitidine HCl 300 mg PO DAILY 08/25/17

## 2017-09-25 NOTE — HP
Admitting History and Physical





- Primary Care Physician


PCP: Kenji Low





- Admission


Chief Complaint: left sided pain


History of Present Illness: 


The patient is a 57 year old female with a significant past medical history of 

recurrent UtIs, acid reflux, IBS, and HLD who presents to the ED with 

complaints of suprapubic pain since earlier yesterday. The patient reports a 

sudden onset of suprapubic  pain around 6:30 pm yesterday. She states her pain 

progressively worsened and her suprapubic pain is now radiating to her left 

flank. Patient also reports one episode of vomiting associated with presents 

symptoms. 


Patient states she was recently prescribed Cipro on Tuesday for a UTI. Patient 

was recently admitted to the hospital on 8/25/17 for pyelonephritis and got abx.


Denies fevers or chills. Denies chest pain or shortness of breath. Denies 

headache, lightheadedness, or dizziness. Denies dysuria or changes in urinary 

output. Denies any other symptoms. 





PCP: Dr. Low 


GL: Dr. Butler





in ER found leukocytosis 


ct scan shows stone at UVJ leftside and left sided hydronephrosis


on iv abx


History Source: Patient





- Past Medical History


Cardiovascular: Yes: Hyperlipdemia


Gastrointestinal: Yes: Irritable Bowel Disease


Renal/: Yes: UTI (previously admitted for pyelonephritis 8/25/17)


Endocrine: No: Diabetes Mellitus





- Past Surgical History


Past Surgical History: Yes: Tonsillectomy





- Smoking History


Smoking history: Former smoker


Have you smoked in the past 12 months: No


Aproximately how many cigarettes per day: 10


If you are a former smoker, when did you quit?: 2012





- Alcohol/Substance Use


Hx Alcohol Use: No


History of Substance Use: reports: None





- Social History


History of Recent Travel: No





Home Medications





- Allergies


Allergies/Adverse Reactions: 


 Allergies











Allergy/AdvReac Type Severity Reaction Status Date / Time


 


No Known Allergies Allergy   Verified 09/24/17 20:09














- Home Medications


Home Medications: 


Ambulatory Orders





Atorvastatin Ca [Lipitor] 40 mg PO HS 05/12/14 


Albuterol Sulfate Inhaler - [Ventolin HFA Inhaler -] 1 - 2 inh PO QID PRN 09/30/ 14 


Linaclotide [Linzess] 72 mcg PO DAILY 08/25/17 


Metronidazole 250 mg PO TID 08/25/17 


Ranitidine HCl 300 mg PO DAILY 08/25/17 











Review of Systems





- Review of Systems


Gastrointestinal: reports: Other (left sided cva pain)





Physical Examination


Vital Signs: 


 Vital Signs











Temperature  97.3 F L  09/25/17 08:53


 


Pulse Rate  73   09/25/17 08:53


 


Respiratory Rate  17   09/25/17 08:53


 


Blood Pressure  102/56   09/25/17 08:53


 


O2 Sat by Pulse Oximetry (%)  97   09/25/17 04:15











Constitutional: Yes: Calm


Cardiovascular: Yes: Regular Rate and Rhythm, S1, S2


Respiratory: Yes: CTA Bilaterally


Gastrointestinal: Yes: Normal Bowel Sounds, Soft, Other (left cva tenderness)


Edema: No


Neurological: Yes: Alert, Oriented


Labs: 


 CBC, BMP





 09/25/17 06:00 











Imaging





- Results


Cat Scan: Report Reviewed





Problem List





- Problems


(1) Hydronephrosis


Assessment/Plan: 


NPO  for possible stenting 


ivf


urology eval pending


Code(s): N13.30 - UNSPECIFIED HYDRONEPHROSIS   Qualifiers: 


     Hydronephrosis type: unspecified     Qualified Code(s): N13.30 - 

Unspecified hydronephrosis  





(2) Kidney stone on left side


Assessment/Plan: 


ivf 


iv abx


Code(s): N20.0 - CALCULUS OF KIDNEY





(3) Leukocytosis


Assessment/Plan: 


repeat cbc today


Code(s): D72.829 - ELEVATED WHITE BLOOD CELL COUNT, UNSPECIFIED   





(4) HLD (hyperlipidemia)


Assessment/Plan: 


on statin


Code(s): E78.5 - HYPERLIPIDEMIA, UNSPECIFIED   





(5) Chronic GERD


Assessment/Plan: 


zantac


Code(s): K21.9 - GASTRO-ESOPHAGEAL REFLUX DISEASE WITHOUT ESOPHAGITIS





(6) History of IBS


Assessment/Plan: 


to use own medication linaclotide


Code(s): Z87.19 - PERSONAL HISTORY OF OTHER DISEASES OF THE DIGESTIVE SYSTEM

## 2017-09-25 NOTE — CON.GU
Consult


Consult Specialty:: urology


Referred by:: Devante


Reason for Consultation:: left renal colic





- History of Present Illness


Chief Complaint: left renal colic


History of Present Illness: 


Patient s/p left pyelonephritis by report one month ago.  The patient now 

presents with an elevated WBC and severe left colic with nausea and vominting.  

The patient denies fever or chills or gross hematuria.





- History Source


History Provided By: Patient


Limitations to Obtaining History: No Limitations





- Past Medical History


Cardio/Vascular: Yes: Hyperlipdemia


Gastrointestinal: Yes: Irritable Bowel Disease


Renal/: Yes: UTI (previously admitted for pyelonephritis 8/25/17)


Endocrine: No: Diabetes Mellitus





- Past Surgical History


Past Surgical History: Yes: Tonsillectomy





- Alcohol/Substance Use


Hx Alcohol Use: No


History of Substance Use: reports: None





- Smoking History


Smoking history: Former smoker


Have you smoked in the past 12 months: No


Aproximately how many cigarettes per day: 10


If you are a former smoker, when did you quit?: 2012





- Social History


Usual Living Arrangement: Alone


History of Recent Travel: No





Home Medications





- Allergies


Allergies/Adverse Reactions: 


 Allergies











Allergy/AdvReac Type Severity Reaction Status Date / Time


 


No Known Allergies Allergy   Verified 09/24/17 20:09














- Home Medications


Home Medications: 


Ambulatory Orders





Atorvastatin Ca [Lipitor] 40 mg PO HS 05/12/14 


Albuterol Sulfate Inhaler - [Ventolin HFA Inhaler -] 1 - 2 inh PO QID PRN 09/30/ 14 


Metronidazole 250 mg PO TID 08/25/17 


Ranitidine HCl 300 mg PO DAILY 08/25/17 











Physical Exam-


Vital Signs: 


 Vital Signs











Temperature  97.3 F L  09/25/17 08:53


 


Pulse Rate  73   09/25/17 08:53


 


Respiratory Rate  17 09/25/17 08:53


 


Blood Pressure  102/56   09/25/17 08:53


 


O2 Sat by Pulse Oximetry (%)  97   09/25/17 04:15











Constitutional: Yes: Well Nourished, No Distress, Calm


Eyes: Yes: WNL, Conjunctiva Clear, EOM Intact


HENT: Yes: WNL, Atraumatic, Normocephalic


Neck: Yes: WNL, Supple, Trachea Midline


Cardiovascular: Yes: WNL


Respiratory: Yes: WNL, Regular


Gastrointestinal: Yes: WNL, Normal Bowel Sounds, Soft


Renal/: Yes: WNL


Kidneys: Yes: WNL


Pelvis: Yes: WNL


External Genitalia: Yes: WNL


Musculoskeletal: Yes: WNL


Labs: 


 CBC, BMP





 09/25/17 13:24 





 09/25/17 06:00 











Imaging





- Results


Cat Scan: Report Reviewed





Assessment/Plan


impression


left distal ureteral stone with hydronephrosis


renal colic





plan


patient is doing well; will start a diet and observe.  Urologically stable for d

/c if medically stable and tolerating a diet.  Will follow-up as outpatient

## 2017-09-26 VITALS — SYSTOLIC BLOOD PRESSURE: 122 MMHG | HEART RATE: 79 BPM | TEMPERATURE: 98.5 F | DIASTOLIC BLOOD PRESSURE: 65 MMHG

## 2017-09-26 LAB
ALBUMIN SERPL-MCNC: 3.2 G/DL (ref 3.4–5)
ALBUMIN SERPL-MCNC: 3.4 G/DL (ref 3.4–5)
ALP SERPL-CCNC: 133 U/L (ref 45–117)
ALP SERPL-CCNC: 139 U/L (ref 45–117)
ALT SERPL-CCNC: 34 U/L (ref 12–78)
ALT SERPL-CCNC: 34 U/L (ref 12–78)
ANION GAP SERPL CALC-SCNC: 3 MMOL/L (ref 8–16)
ANION GAP SERPL CALC-SCNC: 5 MMOL/L (ref 8–16)
AST SERPL-CCNC: 21 U/L (ref 15–37)
AST SERPL-CCNC: 23 U/L (ref 15–37)
BASOPHILS # BLD: 0.3 % (ref 0–2)
BILIRUB SERPL-MCNC: 0.7 MG/DL (ref 0.2–1)
BILIRUB SERPL-MCNC: 0.8 MG/DL (ref 0.2–1)
CALCIUM SERPL-MCNC: 8.9 MG/DL (ref 8.5–10.1)
CALCIUM SERPL-MCNC: 9.2 MG/DL (ref 8.5–10.1)
CO2 SERPL-SCNC: 30 MMOL/L (ref 21–32)
CO2 SERPL-SCNC: 32 MMOL/L (ref 21–32)
CREAT SERPL-MCNC: 0.5 MG/DL (ref 0.55–1.02)
CREAT SERPL-MCNC: 0.6 MG/DL (ref 0.55–1.02)
DEPRECATED RDW RBC AUTO: 14.3 % (ref 11.6–15.6)
EOSINOPHIL # BLD: 3.2 % (ref 0–4.5)
GLUCOSE SERPL-MCNC: 78 MG/DL (ref 74–106)
GLUCOSE SERPL-MCNC: 98 MG/DL (ref 74–106)
MCH RBC QN AUTO: 26.9 PG (ref 25.7–33.7)
MCHC RBC AUTO-ENTMCNC: 32.8 G/DL (ref 32–36)
MCV RBC: 82.1 FL (ref 80–96)
NEUTROPHILS # BLD: 80 % (ref 42.8–82.8)
PLATELET # BLD AUTO: 277 K/MM3 (ref 134–434)
PMV BLD: 8.1 FL (ref 7.5–11.1)
PROT SERPL-MCNC: 6.1 G/DL (ref 6.4–8.2)
PROT SERPL-MCNC: 6.6 G/DL (ref 6.4–8.2)
WBC # BLD AUTO: 10.5 K/MM3 (ref 4–10)

## 2017-09-26 RX ADMIN — RANITIDINE SCH MG: 150 TABLET ORAL at 10:44

## 2017-09-26 RX ADMIN — HEPARIN SODIUM SCH UNIT: 5000 INJECTION, SOLUTION INTRAVENOUS; SUBCUTANEOUS at 11:00

## 2017-09-26 RX ADMIN — POTASSIUM CHLORIDE, DEXTROSE MONOHYDRATE AND SODIUM CHLORIDE SCH MLS/HR: 150; 5; 450 INJECTION, SOLUTION INTRAVENOUS at 11:03

## 2017-09-26 RX ADMIN — CEFTRIAXONE SCH MLS/HR: 1 INJECTION, POWDER, FOR SOLUTION INTRAMUSCULAR; INTRAVENOUS at 11:00

## 2017-09-26 NOTE — PN
Teaching Attending Note


Name of Resident: Rod Saldana


ATTENDING PHYSICIAN STATEMENT





I saw and evaluated the patient.


I reviewed the resident's note and discussed the case with the resident.


I agree with the resident's findings and plan as documented.








SUBJECTIVE:








OBJECTIVE:








ASSESSMENT AND PLAN:


Nephrolithiasis


Pt painfree; afebrile; WBC  improved


Sonogram no HN


Cultures negative


Switch to po ceftin 500mg bid x 7d


Outpatient  follow up

## 2017-09-26 NOTE — PN
Physical Exam: 


Consulting Specialty Progress Note: Infectious Disease





SUBJECTIVE: 56yo F with obstructive nephrolithiasis with associated 

hydronephrosis. Urology saw patient yesterday and recommended no intervention 

at this time and discharge when medically appropriate. Pt feels better today 

and has been tolerating a regular diet without problem. No fevers/chills noted. 

Patient producing urine without problems. 








OBJECTIVE:





 Vital Signs











 Period  Temp  Pulse  Resp  BP Sys/Jones  Pulse Ox


 


 Last 24 Hr  97.6 F-99 F  58-74  16-20  100-123/52-73  98











GENERAL: The patient is awake, alert, and fully oriented, in no acute distress.


EYES: PERRL, EOMI 


LUNGS: CTA bilaterally, no wheezes, no crackles, no accessory muscle use. 


HEART: RRR, S1, S2 without murmur, rub or gallop.


ABDOMEN: Soft, suprapubic tenderness, nondistended, normoactive bowel sounds


MSK: No R CVA tenderness currently


EXTREMITIES: No edema. 


NEUROLOGICAL: Alert and oriented x3














 Laboratory Results - last 24 hr











  09/26/17 09/26/17 09/26/17





  07:00 10:10 10:10


 


WBC   10.5 H 


 


RBC   4.86 


 


Hgb   13.1  D 


 


Hct   39.9 


 


MCV   82.1 


 


MCH   26.9 


 


MCHC   32.8 


 


RDW   14.3 


 


Plt Count   277 


 


MPV   8.1 


 


Neutrophils %   80.0 


 


Lymphocytes %   10.6  D 


 


Monocytes %   5.9 


 


Eosinophils %   3.2 


 


Basophils %   0.3 


 


Sodium  141   141


 


Potassium  4.5   4.1


 


Chloride  106   106


 


Carbon Dioxide  30   32


 


Anion Gap  5 L   3 L


 


BUN  12  D   11


 


Creatinine  0.5 L   0.6


 


Creat Clearance w eGFR  > 60   > 60


 


Random Glucose  98   78  D


 


Calcium  8.9   9.2


 


Total Bilirubin  0.8   0.7


 


AST  21   23


 


ALT  34   34


 


Alkaline Phosphatase  133 H   139 H


 


Total Protein  6.1 L   6.6


 


Albumin  3.2 L   3.4








Active Medications











Generic Name Dose Route Start Last Admin





  Trade Name Freq  PRN Reason Stop Dose Admin


 


Acetaminophen  650 mg 09/25/17 01:04 09/25/17 20:12





  Tylenol -  PO   650 mg





  Q4H PRN   Administration





  FEVER OR PAIN   


 


Atorvastatin Calcium  40 mg 09/25/17 22:00 09/25/17 22:32





  Lipitor -  PO   40 mg





  HS NIKO   Administration


 


Heparin Sodium (Porcine)  5,000 unit 09/25/17 22:00 09/26/17 11:00





  Heparin -  SQ   5,000 unit





  BID NIKO   Administration


 


Potassium Chloride/Dextrose/Sod Cl  1,000 mls @ 100 mls/hr 09/25/17 01:15 09/26/ 17 11:03





  D5-1/2ns+20 Meq Kcl -  IV   100 mls/hr





  ASDIR NIKO   Administration


 


Ceftriaxone Sodium 1 gm/  50 mls @ 100 mls/hr 09/25/17 10:30 09/26/17 11:00





  Dextrose  IVPB   100 mls/hr





  DAILY NIKO   Administration


 


Ondansetron HCl  4 mg 09/25/17 01:10  





  Zofran Injection  IVPB   





  Q4H PRN   





  NAUSEA AND/OR VOMITING   


 


Ranitidine HCl  300 mg 09/25/17 10:00 09/26/17 10:44





  Zantac -  PO   300 mg





  DAILY NIKO   Administration











ASSESSMENT:





Obstructive nephrolithiasis with associated hydronephrosis


Suspected UTI syndrome





PLAN:


   WBC trending down (12.5 to 10 today)


   Plan per primary note is to obtain b/l renal US prior to discharge


   In light of iminent discharge will most likely transition to PO empiric 

coverage; however will await results of US





Visit type





- Emergency Visit


Emergency Visit: No





- New Patient


This patient is new to me today: No





- Critical Care


Critical Care patient: No

## 2017-09-26 NOTE — PN
Progress Note, Physician


Chief Complaint: 


patient was naseous in morning felt better after eating breakfast


then complaing of left flank pain and suprapubic pain right now





- Current Medication List


Current Medications: 


Active Medications





Acetaminophen (Tylenol -)  650 mg PO Q4H PRN


   PRN Reason: FEVER OR PAIN


   Last Admin: 09/25/17 20:12 Dose:  650 mg


Atorvastatin Calcium (Lipitor -)  40 mg PO HS [facility]


   Last Admin: 09/25/17 22:32 Dose:  40 mg


Heparin Sodium (Porcine) (Heparin -)  5,000 unit SQ BID [facility]


   Last Admin: 09/25/17 22:32 Dose:  5,000 unit


Potassium Chloride/Dextrose/Sod Cl (D5-1/2ns+20 Meq Kcl -)  1,000 mls @ 100 mls/

hr IV ASDIR [facility]


   Last Admin: 09/25/17 22:34 Dose:  100 mls/hr


Ceftriaxone Sodium 1 gm/ (Dextrose)  50 mls @ 100 mls/hr IVPB DAILY [facility]


   Last Admin: 09/25/17 11:13 Dose:  100 mls/hr


Ondansetron HCl (Zofran Injection)  4 mg IVPB Q4H PRN


   PRN Reason: NAUSEA AND/OR VOMITING


Ranitidine HCl (Zantac -)  300 mg PO DAILY [facility]


   Last Admin: 09/25/17 11:03 Dose:  300 mg











- Objective


Vital Signs: 


 Vital Signs











Temperature  99 F   09/26/17 09:02


 


Pulse Rate  58 L  09/26/17 09:02


 


Respiratory Rate  16   09/26/17 09:02


 


Blood Pressure  123/73   09/26/17 09:02


 


O2 Sat by Pulse Oximetry (%)  97   09/25/17 09:00











Neck: Yes: Trachea Midline


Cardiovascular: Yes: Regular Rate and Rhythm, S1, S2


Respiratory: Yes: CTA Bilaterally


Gastrointestinal: Yes: Normal Bowel Sounds, Soft


Edema: No


Neurological: Yes: Alert, Oriented


Labs: 


 CBC, BMP





 09/25/17 13:24 





 09/26/17 07:00 











Problem List





- Problems


(1) Hydronephrosis


Assessment/Plan: 


repeat ultrasound today to see if any change


ivf


urology eval noted


Code(s): N13.30 - UNSPECIFIED HYDRONEPHROSIS   Qualifiers: 


     Hydronephrosis type: unspecified     Qualified Code(s): N13.30 - 

Unspecified hydronephrosis  





(2) Kidney stone on left side


Assessment/Plan: 


ivf 


iv abx rocephin 


will change po abx when she goes home


Code(s): N20.0 - CALCULUS OF KIDNEY





(3) Leukocytosis


Assessment/Plan: 


repeat cbc today last wbc 12 trending down


Code(s): D72.829 - ELEVATED WHITE BLOOD CELL COUNT, UNSPECIFIED   





(4) HLD (hyperlipidemia)


Assessment/Plan: 


on statin


Code(s): E78.5 - HYPERLIPIDEMIA, UNSPECIFIED   





(5) Chronic GERD


Assessment/Plan: 


zantac


Code(s): K21.9 - GASTRO-ESOPHAGEAL REFLUX DISEASE WITHOUT ESOPHAGITIS





(6) History of IBS


Assessment/Plan: 


stooped using linactolide


Code(s): Z87.19 - PERSONAL HISTORY OF OTHER DISEASES OF THE DIGESTIVE SYSTEM

## 2017-10-03 NOTE — EKG
Test Reason : 

Blood Pressure : ***/*** mmHG

Vent. Rate : 081 BPM     Atrial Rate : 081 BPM

   P-R Int : 184 ms          QRS Dur : 096 ms

    QT Int : 392 ms       P-R-T Axes : 015 035 021 degrees

   QTc Int : 455 ms

 

NORMAL SINUS RHYTHM

NORMAL ECG

NO PREVIOUS ECGS AVAILABLE

Confirmed by TRISHA TEJADA MD ([id number]) on 10/3/2017 9:49:05 AM

 

Referred By:             Confirmed By:TRISHA TEJADA MD

## 2018-02-23 ENCOUNTER — HOSPITAL ENCOUNTER (OUTPATIENT)
Dept: HOSPITAL 74 - FASU | Age: 58
Discharge: HOME | End: 2018-02-23
Attending: ORTHOPAEDIC SURGERY
Payer: COMMERCIAL

## 2018-02-23 VITALS — DIASTOLIC BLOOD PRESSURE: 62 MMHG | SYSTOLIC BLOOD PRESSURE: 118 MMHG | TEMPERATURE: 98 F | HEART RATE: 69 BPM

## 2018-02-23 VITALS — BODY MASS INDEX: 30.4 KG/M2

## 2018-02-23 DIAGNOSIS — S83.242A: Primary | ICD-10-CM

## 2018-02-23 DIAGNOSIS — M65.862: ICD-10-CM
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
## 2018-02-23 DIAGNOSIS — S83.8X2A: ICD-10-CM
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
## 2018-02-23 DIAGNOSIS — S83.282A: ICD-10-CM

## 2018-02-23 PROCEDURE — 0SBD4ZZ EXCISION OF LEFT KNEE JOINT, PERCUTANEOUS ENDOSCOPIC APPROACH: ICD-10-PCS | Performed by: ORTHOPAEDIC SURGERY

## 2018-02-25 NOTE — OP
DATE OF OPERATION:  02/23/2018

 

LOCATION:  [facility].

 

SURGEON:  Rod Holt MD

 

ASSISTANT:  ARACELI Shell

 

PREOPERATIVE DIAGNOSES:

1.  Left knee medial and lateral meniscal tear.

2.  Left knee cartilage injury.

3.  Left knee synovitis.

 

POSTOPERATIVE DIAGNOSES:

1.  Left knee medial and lateral meniscal tear.

2.  Left knee cartilage injury.

3.  Left knee synovitis.

 

PROCEDURE:

1.  Left knee arthroscopy, partial meniscectomy, medial and lateral meniscus.

2.  Left knee arthroscopy with chondroplasty and abrasion-plasty.

3.  Left knee arthroscopy with synovectomy, partial, including medial meniscus.

 

FINDINGS:

1.  Medial meniscus body and posterior horn tear.

2.  Lateral meniscus anterior horn and body tear.

3.  Synovitis, patellofemoral, medial and lateral notch area.

4.  Grade 1 to 2 cartilage injury, medial femoral condyle, with grade 2 changes

diffusely in the medial tibial plateau, and anterior grade 4 changes.

5.  ACL and PCL intact.

6.  Diffuse grade 1 to 2 cartilage injury, lateral joint line.

7.  Central grade 2 cartilage injury, patella, with grade 2 to 4 changes to

patellofemoral trochlea.

 

PROCEDURE:  Informed consent was obtained.  The patient came to the operating room,

where the lower extremity was prepped and draped in a sterile fashion.  A tourniquet

was placed on the upper thigh, but not inflated. 

 

Using standard arthroscopic technique, a lateral incision and portal was made to

allow for introduction of the camera into the suprapatellar bursa.  This was then

taken to the medial joint line, where under direct visualization, a medial incision

and portal was made.

 

Excessive synovium noted in the medial, lateral and patellofemoral and notch area was

removed by an upbiter, shaver and Bovie cautery.  This was found to bring in

inflammatory tissue into the joint surface, a source of pain and dysfunction. 

 

Probing of the medial and lateral meniscus found tears, as described in the findings.

 These were removed with the upbiter and shaver and taken back to a stable rim. 

 

Grade 2 to 3 degenerative changes were treated with a chondroplasty, removing all

flaking surfaces with low-setting Bovie along the periphery to prevent further

flaking.  

 

Grade 4 changes, as noted, were treated with an abrasoplasty, creating a bleeding

surface at the bone/cartilage interface. Aggressive debridement with shaver/shana

created bleeding surface.  Micro fracture also done when indicated in findings

 

All areas of the knee were once again reexamined.  The knee was then drained and a

single suture was placed in all portals.  A sterile dressing was placed and the

patient was transferred to the recovery room without complication. 

 

 

ROD HOLT M.D.

 

[id number]

DD: 02/24/2018 17:21

DT: 02/25/2018 17:17

Job #:  [id number]

## 2018-03-01 NOTE — PATH
Surgical Pathology Report



Patient Name:  VINITA SHANNON

Accession #:  

Med. Rec. #:  [id number]                                                        

   /Age/Gender:  1960 (Age: 57) / F

Account:  [id number]                                                          

             Location: [facility] 

Taken:  2018

Received:  2018

Reported:  3/1/2018

Physicians:  Rod Sow M.D.

  



Specimen(s) Received

 LEFT KNEE SHAVINGS 





Clinical History

Left knee lateral derangement







Final Diagnosis

KNEE, LEFT, ARTHROSCOPIC SHAVINGS:

FIBROSYNOVIAL TISSUE SHOWING DENSE LYMPHOPLASMOCYTIC INFILTRATE AND REACTIVE

SYNOVIAL HYPERPLASIA. (SEE NOTE)

FIBROCARTILAGINOUS AND FIBROADIPOSE TISSUE WITH NO PATHOLOGIC FINDINGS. 



Note: Although these findings are non-specific, dense lymphoplasmacytic

infiltrate involving synovium with reactive synovial hyperplasia may be seen in

association with rheumatoid arthritis. Correlation with clinico-radiologic and

serologic findings is suggested.





***Electronically Signed***

Gale Newell M.D.





Gross Description

Received in formalin, labeled "left knee shavings," is a 3.5 x 3.3 x 0.3 cm.

aggregate of tan-yellow soft tissue fragments. A representative portion is

submitted in one cassette.

## 2021-07-04 ENCOUNTER — HOSPITAL ENCOUNTER (EMERGENCY)
Dept: HOSPITAL 74 - JER | Age: 61
Discharge: HOME | End: 2021-07-04
Payer: COMMERCIAL

## 2021-07-04 VITALS — SYSTOLIC BLOOD PRESSURE: 111 MMHG | HEART RATE: 86 BPM | DIASTOLIC BLOOD PRESSURE: 72 MMHG

## 2021-07-04 VITALS — TEMPERATURE: 98 F

## 2021-07-04 VITALS — BODY MASS INDEX: 28.3 KG/M2

## 2021-07-04 DIAGNOSIS — R05: Primary | ICD-10-CM

## 2021-07-04 PROCEDURE — U0005 INFEC AGEN DETEC AMPLI PROBE: HCPCS

## 2021-07-04 PROCEDURE — C9803 HOPD COVID-19 SPEC COLLECT: HCPCS

## 2021-07-04 PROCEDURE — U0003 INFECTIOUS AGENT DETECTION BY NUCLEIC ACID (DNA OR RNA); SEVERE ACUTE RESPIRATORY SYNDROME CORONAVIRUS 2 (SARS-COV-2) (CORONAVIRUS DISEASE [COVID-19]), AMPLIFIED PROBE TECHNIQUE, MAKING USE OF HIGH THROUGHPUT TECHNOLOGIES AS DESCRIBED BY CMS-2020-01-R: HCPCS

## 2022-11-14 ENCOUNTER — OFFICE (OUTPATIENT)
Dept: URBAN - METROPOLITAN AREA CLINIC 121 | Facility: CLINIC | Age: 62
Setting detail: OPHTHALMOLOGY
End: 2022-11-14
Payer: COMMERCIAL

## 2022-11-14 DIAGNOSIS — H40.1113: ICD-10-CM

## 2022-11-14 DIAGNOSIS — H25.13: ICD-10-CM

## 2022-11-14 DIAGNOSIS — H43.393: ICD-10-CM

## 2022-11-14 DIAGNOSIS — H16.223: ICD-10-CM

## 2022-11-14 DIAGNOSIS — H35.033: ICD-10-CM

## 2022-11-14 DIAGNOSIS — H40.033: ICD-10-CM

## 2022-11-14 DIAGNOSIS — H40.1122: ICD-10-CM

## 2022-11-14 PROCEDURE — 92014 COMPRE OPH EXAM EST PT 1/>: CPT | Performed by: OPHTHALMOLOGY

## 2022-11-14 PROCEDURE — 92250 FUNDUS PHOTOGRAPHY W/I&R: CPT | Performed by: OPHTHALMOLOGY

## 2022-11-14 ASSESSMENT — SUPERFICIAL PUNCTATE KERATITIS (SPK)
OS_SPK: 1+
OD_SPK: 1+

## 2022-11-14 ASSESSMENT — TONOMETRY
OD_IOP_MMHG: 20
OS_IOP_MMHG: 20

## 2022-11-14 ASSESSMENT — KERATOMETRY
OS_K1POWER_DIOPTERS: 43.00
OD_AXISANGLE_DEGREES: 090
OD_K1POWER_DIOPTERS: 43.00
OD_K2POWER_DIOPTERS: 44.00
OS_AXISANGLE_DEGREES: 090
OS_K2POWER_DIOPTERS: 43.75

## 2022-11-14 ASSESSMENT — REFRACTION_CURRENTRX
OS_OVR_VA: 20/
OD_OVR_VA: 20/
OS_CYLINDER: +0.75
OD_SPHERE: +0.25
OS_AXIS: 027
OS_SPHERE: +0.50
OD_ADD: +2.75
OD_AXIS: 175
OS_ADD: +2.75
OD_CYLINDER: +1.00

## 2022-11-14 ASSESSMENT — PACHYMETRY
OD_CT_CORRECTION: -4
OD_CT_UM: 604
OS_CT_CORRECTION: -2
OS_CT_UM: 570

## 2022-11-14 ASSESSMENT — VISUAL ACUITY
OD_BCVA: 20/50
OS_BCVA: 20/50

## 2022-11-14 ASSESSMENT — REFRACTION_AUTOREFRACTION
OS_CYLINDER: +0.75
OD_CYLINDER: +1.00
OS_SPHERE: +2.75
OD_AXIS: 009
OD_SPHERE: +1.50
OS_AXIS: 179

## 2022-11-14 ASSESSMENT — CONFRONTATIONAL VISUAL FIELD TEST (CVF)
OS_FINDINGS: FULL
OD_FINDINGS: FULL

## 2022-11-14 ASSESSMENT — SPHEQUIV_DERIVED
OD_SPHEQUIV: 2
OS_SPHEQUIV: 3.125

## 2022-11-14 ASSESSMENT — AXIALLENGTH_DERIVED
OS_AL: 22.4757
OD_AL: 22.8376

## 2023-05-19 ENCOUNTER — OFFICE (OUTPATIENT)
Dept: URBAN - METROPOLITAN AREA CLINIC 121 | Facility: CLINIC | Age: 63
Setting detail: OPHTHALMOLOGY
End: 2023-05-19
Payer: COMMERCIAL

## 2023-05-19 DIAGNOSIS — H25.13: ICD-10-CM

## 2023-05-19 DIAGNOSIS — H40.1113: ICD-10-CM

## 2023-05-19 DIAGNOSIS — H35.033: ICD-10-CM

## 2023-05-19 DIAGNOSIS — H16.223: ICD-10-CM

## 2023-05-19 DIAGNOSIS — H43.393: ICD-10-CM

## 2023-05-19 DIAGNOSIS — H40.033: ICD-10-CM

## 2023-05-19 DIAGNOSIS — H40.1122: ICD-10-CM

## 2023-05-19 PROCEDURE — 92250 FUNDUS PHOTOGRAPHY W/I&R: CPT | Performed by: OPHTHALMOLOGY

## 2023-05-19 PROCEDURE — 99214 OFFICE O/P EST MOD 30 MIN: CPT | Performed by: OPHTHALMOLOGY

## 2023-05-19 ASSESSMENT — REFRACTION_CURRENTRX
OS_AXIS: 027
OD_ADD: +2.75
OS_SPHERE: +0.50
OS_CYLINDER: +0.75
OD_CYLINDER: +1.00
OD_AXIS: 175
OD_OVR_VA: 20/
OS_OVR_VA: 20/
OS_ADD: +2.75
OD_SPHERE: +0.25

## 2023-05-19 ASSESSMENT — REFRACTION_AUTOREFRACTION
OD_AXIS: 020
OD_SPHERE: +1.75
OS_CYLINDER: +0.75
OD_CYLINDER: +1.25
OS_AXIS: 017
OS_SPHERE: +2.75

## 2023-05-19 ASSESSMENT — VISUAL ACUITY
OD_BCVA: 20/50
OS_BCVA: 20/50

## 2023-05-19 ASSESSMENT — AXIALLENGTH_DERIVED
OS_AL: 22.4757
OD_AL: 22.8298

## 2023-05-19 ASSESSMENT — SUPERFICIAL PUNCTATE KERATITIS (SPK)
OS_SPK: 1+
OD_SPK: 1+

## 2023-05-19 ASSESSMENT — PACHYMETRY
OS_CT_UM: 570
OD_CT_CORRECTION: -4
OD_CT_UM: 604
OS_CT_CORRECTION: -2

## 2023-05-19 ASSESSMENT — CONFRONTATIONAL VISUAL FIELD TEST (CVF)
OS_FINDINGS: FULL
OD_FINDINGS: FULL

## 2023-05-19 ASSESSMENT — KERATOMETRY
OS_AXISANGLE_DEGREES: 097
OD_K1POWER_DIOPTERS: 42.75
OD_AXISANGLE_DEGREES: 087
OS_K1POWER_DIOPTERS: 43.00
OS_K2POWER_DIOPTERS: 43.75
OD_K2POWER_DIOPTERS: 43.50

## 2023-05-19 ASSESSMENT — SPHEQUIV_DERIVED
OS_SPHEQUIV: 3.125
OD_SPHEQUIV: 2.375

## 2023-05-19 ASSESSMENT — TONOMETRY: OS_IOP_MMHG: 20

## 2023-06-12 ENCOUNTER — OFFICE (OUTPATIENT)
Dept: URBAN - METROPOLITAN AREA CLINIC 121 | Facility: CLINIC | Age: 63
Setting detail: OPHTHALMOLOGY
End: 2023-06-12
Payer: COMMERCIAL

## 2023-06-12 DIAGNOSIS — H35.033: ICD-10-CM

## 2023-06-12 DIAGNOSIS — H25.13: ICD-10-CM

## 2023-06-12 DIAGNOSIS — H25.12: ICD-10-CM

## 2023-06-12 PROCEDURE — 92134 CPTRZ OPH DX IMG PST SGM RTA: CPT | Performed by: OPHTHALMOLOGY

## 2023-06-12 PROCEDURE — 99213 OFFICE O/P EST LOW 20 MIN: CPT | Performed by: OPHTHALMOLOGY

## 2023-06-12 PROCEDURE — 92136 OPHTHALMIC BIOMETRY: CPT | Performed by: OPHTHALMOLOGY

## 2023-06-12 ASSESSMENT — KERATOMETRY
OD_CYLPOWER_DEGREES: 0.5
OS_AXISANGLE_DEGREES: 101
OD_K2POWER_DIOPTERS: 43.50
OS_CYLAXISANGLE_DEGREES: 101
OS_AXISANGLE2_DEGREES: 101
OS_K2POWER_DIOPTERS: 43.50
OS_AXISANGLE_DEGREES: 101
OD_K1POWER_DIOPTERS: 43.00
OS_K1POWER_DIOPTERS: 43.00
OD_K1K2_AVERAGE: 43.25
OD_AXISANGLE_DEGREES: 089
OS_K2POWER_DIOPTERS: 43.50
OD_K1POWER_DIOPTERS: 43.00
OD_AXISANGLE_DEGREES: 089
OS_K1POWER_DIOPTERS: 43.00
OS_CYLPOWER_DEGREES: 0.5
OD_AXISANGLE2_DEGREES: 089
OS_K1K2_AVERAGE: 43.25
OD_CYLAXISANGLE_DEGREES: 89
OD_K2POWER_DIOPTERS: 43.50

## 2023-06-12 ASSESSMENT — SPHEQUIV_DERIVED
OS_SPHEQUIV: 0.875
OD_SPHEQUIV: 2
OD_SPHEQUIV: 0.75
OS_SPHEQUIV: 3

## 2023-06-12 ASSESSMENT — REFRACTION_MANIFEST
OD_ADD: +2.75
OS_SPHERE: +0.50
OS_AXIS: 027
OS_CYLINDER: +0.75
OS_ADD: +2.75
OD_CYLINDER: +1.00
OD_SPHERE: +0.25
OD_AXIS: 175

## 2023-06-12 ASSESSMENT — PACHYMETRY
OS_CT_CORRECTION: -2
OD_CT_UM: 604
OS_CT_UM: 570
OD_CT_CORRECTION: -4

## 2023-06-12 ASSESSMENT — REFRACTION_CURRENTRX
OD_AXIS: 175
OD_CYLINDER: +1.00
OS_OVR_VA: 20/
OS_ADD: +2.75
OD_ADD: +2.75
OS_CYLINDER: +0.75
OS_SPHERE: +0.50
OD_OVR_VA: 20/
OD_SPHERE: +0.25
OS_AXIS: 027

## 2023-06-12 ASSESSMENT — REFRACTION_AUTOREFRACTION
OD_AXIS: 016
OS_SPHERE: +2.75
OS_CYLINDER: +0.50
OD_SPHERE: +1.75
OS_AXIS: 012
OD_CYLINDER: +0.50

## 2023-06-12 ASSESSMENT — CONFRONTATIONAL VISUAL FIELD TEST (CVF)
OS_FINDINGS: FULL
OD_FINDINGS: FULL

## 2023-06-12 ASSESSMENT — SUPERFICIAL PUNCTATE KERATITIS (SPK)
OS_SPK: 1+
OD_SPK: 1+

## 2023-06-12 ASSESSMENT — VISUAL ACUITY
OS_BCVA: 20/50
OD_BCVA: 20/50

## 2023-06-12 ASSESSMENT — AXIALLENGTH_DERIVED
OD_AL: 23.3932
OS_AL: 22.5619
OS_AL: 23.3454
OD_AL: 22.924

## 2023-06-12 ASSESSMENT — TONOMETRY
OS_IOP_MMHG: 19
OD_IOP_MMHG: 19

## 2023-09-12 ENCOUNTER — AMBULATORY SURGERY CENTER (OUTPATIENT)
Dept: URBAN - METROPOLITAN AREA SURGERY 6 | Facility: SURGERY | Age: 63
Setting detail: OPHTHALMOLOGY
End: 2023-09-12
Payer: COMMERCIAL

## 2023-09-12 DIAGNOSIS — H25.12: ICD-10-CM

## 2023-09-12 PROCEDURE — 66984 XCAPSL CTRC RMVL W/O ECP: CPT | Performed by: OPHTHALMOLOGY

## 2023-09-13 ENCOUNTER — RX ONLY (RX ONLY)
Age: 63
End: 2023-09-13

## 2023-09-13 ENCOUNTER — OFFICE (OUTPATIENT)
Dept: URBAN - METROPOLITAN AREA CLINIC 121 | Facility: CLINIC | Age: 63
Setting detail: OPHTHALMOLOGY
End: 2023-09-13
Payer: COMMERCIAL

## 2023-09-13 DIAGNOSIS — Z96.1: ICD-10-CM

## 2023-09-13 PROCEDURE — 99024 POSTOP FOLLOW-UP VISIT: CPT | Performed by: OPHTHALMOLOGY

## 2023-09-13 ASSESSMENT — REFRACTION_MANIFEST
OS_AXIS: 027
OS_CYLINDER: +0.75
OD_CYLINDER: +1.00
OS_SPHERE: +0.50
OD_ADD: +2.75
OS_ADD: +2.75
OD_AXIS: 175
OD_SPHERE: +0.25

## 2023-09-13 ASSESSMENT — PACHYMETRY
OS_CT_CORRECTION: -2
OD_CT_UM: 604
OD_CT_CORRECTION: -4
OS_CT_UM: 570

## 2023-09-13 ASSESSMENT — REFRACTION_AUTOREFRACTION
OD_AXIS: 016
OS_AXIS: 012
OS_SPHERE: +2.75
OS_CYLINDER: +0.50
OD_SPHERE: +1.75
OD_CYLINDER: +0.50

## 2023-09-13 ASSESSMENT — KERATOMETRY
OD_AXISANGLE_DEGREES: 089
OD_K2POWER_DIOPTERS: 43.50
OS_K1POWER_DIOPTERS: 43.00
OS_AXISANGLE_DEGREES: 101
OD_K1POWER_DIOPTERS: 43.00
OS_K2POWER_DIOPTERS: 43.50

## 2023-09-13 ASSESSMENT — REFRACTION_CURRENTRX
OD_OVR_VA: 20/
OD_ADD: +2.75
OS_SPHERE: +0.50
OS_CYLINDER: +0.75
OD_CYLINDER: +1.00
OD_SPHERE: +0.25
OS_AXIS: 027
OS_OVR_VA: 20/
OD_AXIS: 175
OS_ADD: +2.75

## 2023-09-13 ASSESSMENT — CONFRONTATIONAL VISUAL FIELD TEST (CVF)
OD_FINDINGS: FULL
OS_FINDINGS: FULL

## 2023-09-13 ASSESSMENT — SUPERFICIAL PUNCTATE KERATITIS (SPK)
OS_SPK: 1+
OD_SPK: 1+

## 2023-09-13 ASSESSMENT — VISUAL ACUITY
OS_BCVA: 20/50
OD_BCVA: 20/40

## 2023-09-13 ASSESSMENT — SPHEQUIV_DERIVED
OS_SPHEQUIV: 3
OS_SPHEQUIV: 0.875
OD_SPHEQUIV: 0.75
OD_SPHEQUIV: 2

## 2023-09-13 ASSESSMENT — AXIALLENGTH_DERIVED
OS_AL: 23.3454
OD_AL: 23.3932
OS_AL: 22.5619
OD_AL: 22.924

## 2023-09-13 ASSESSMENT — TONOMETRY: OS_IOP_MMHG: 16

## 2023-09-27 ENCOUNTER — OFFICE (OUTPATIENT)
Dept: URBAN - METROPOLITAN AREA CLINIC 121 | Facility: CLINIC | Age: 63
Setting detail: OPHTHALMOLOGY
End: 2023-09-27
Payer: COMMERCIAL

## 2023-09-27 DIAGNOSIS — H25.11: ICD-10-CM

## 2023-09-27 PROBLEM — Z96.1 PSEUDOPHAKIA ; LEFT EYE: Status: ACTIVE | Noted: 2023-09-13

## 2023-09-27 PROBLEM — B08.1 MOLLUSCUM CONTAGIOSUM: Status: ACTIVE | Noted: 2023-09-27

## 2023-09-27 PROCEDURE — 92286 ANT SGM IMG I&R SPECLR MIC: CPT | Performed by: OPHTHALMOLOGY

## 2023-09-27 PROCEDURE — 92136 OPHTHALMIC BIOMETRY: CPT | Performed by: OPHTHALMOLOGY

## 2023-09-27 ASSESSMENT — PACHYMETRY
OD_CT_CORRECTION: -1
OD_CT_UM: 558
OS_CT_CORRECTION: -1
OS_CT_UM: 550

## 2023-09-27 ASSESSMENT — REFRACTION_MANIFEST
OD_CYLINDER: +1.00
OS_AXIS: 027
OS_ADD: +2.75
OD_ADD: +2.75
OS_SPHERE: +0.50
OS_CYLINDER: +0.75
OD_AXIS: 175
OD_SPHERE: +0.25

## 2023-09-27 ASSESSMENT — SPHEQUIV_DERIVED
OD_SPHEQUIV: 0.75
OS_SPHEQUIV: 0.875
OD_SPHEQUIV: 1.875
OS_SPHEQUIV: 0.125

## 2023-09-27 ASSESSMENT — KERATOMETRY
OD_AXISANGLE2_DEGREES: 095
OS_AXISANGLE2_DEGREES: 096
OD_K2POWER_DIOPTERS: 44.00
OS_K1K2_AVERAGE: 43.5
OS_K1POWER_DIOPTERS: 43.00
METHOD_AUTO_MANUAL: AUTO
OS_CYLPOWER_DEGREES: 1
OS_AXISANGLE_DEGREES: 6
OD_K1K2_AVERAGE: 43.5
OS_K2POWER_DIOPTERS: 44.00
OS_CYLAXISANGLE_DEGREES: 096
OS_K1POWER_DIOPTERS: 43.00
OD_AXISANGLE_DEGREES: 095
OS_K2POWER_DIOPTERS: 44.00
OD_AXISANGLE_DEGREES: 5
OD_CYLAXISANGLE_DEGREES: 095
OD_K1POWER_DIOPTERS: 43.00
OD_CYLPOWER_DEGREES: 1
OS_AXISANGLE_DEGREES: 096
OD_K1POWER_DIOPTERS: 43.00
OD_K2POWER_DIOPTERS: 44.00

## 2023-09-27 ASSESSMENT — REFRACTION_CURRENTRX
OS_CYLINDER: +0.75
OS_OVR_VA: 20/
OD_AXIS: 175
OS_ADD: +2.75
OD_SPHERE: +0.25
OS_SPHERE: +0.50
OD_OVR_VA: 20/
OS_AXIS: 027
OD_ADD: +2.75
OD_CYLINDER: +1.00

## 2023-09-27 ASSESSMENT — REFRACTION_AUTOREFRACTION
OD_AXIS: 007
OS_SPHERE: 0.00
OD_CYLINDER: +0.75
OS_AXIS: 106
OD_SPHERE: +1.50
OS_CYLINDER: +0.25

## 2023-09-27 ASSESSMENT — AXIALLENGTH_DERIVED
OD_AL: 22.8833
OS_AL: 23.5433
OS_AL: 23.2559
OD_AL: 23.3033

## 2023-09-27 ASSESSMENT — VISUAL ACUITY
OD_BCVA: 20/25
OS_BCVA: 20/50

## 2023-09-27 ASSESSMENT — CONFRONTATIONAL VISUAL FIELD TEST (CVF)
OS_FINDINGS: FULL
OD_FINDINGS: FULL

## 2023-09-27 ASSESSMENT — SUPERFICIAL PUNCTATE KERATITIS (SPK)
OS_SPK: 1+
OD_SPK: 1+

## 2023-09-27 ASSESSMENT — TONOMETRY
OS_IOP_MMHG: 18
OD_IOP_MMHG: 18

## 2023-10-31 ENCOUNTER — AMBULATORY SURGERY CENTER (OUTPATIENT)
Dept: URBAN - METROPOLITAN AREA SURGERY 6 | Facility: SURGERY | Age: 63
Setting detail: OPHTHALMOLOGY
End: 2023-10-31
Payer: COMMERCIAL

## 2023-10-31 DIAGNOSIS — H25.11: ICD-10-CM

## 2023-10-31 PROCEDURE — 66984 XCAPSL CTRC RMVL W/O ECP: CPT | Mod: 79,RT | Performed by: OPHTHALMOLOGY

## 2023-11-01 ENCOUNTER — RX ONLY (RX ONLY)
Age: 63
End: 2023-11-01

## 2023-11-01 ENCOUNTER — OFFICE (OUTPATIENT)
Dept: URBAN - METROPOLITAN AREA CLINIC 121 | Facility: CLINIC | Age: 63
Setting detail: OPHTHALMOLOGY
End: 2023-11-01
Payer: COMMERCIAL

## 2023-11-01 DIAGNOSIS — Z96.1: ICD-10-CM

## 2023-11-01 PROCEDURE — 99024 POSTOP FOLLOW-UP VISIT: CPT | Performed by: OPHTHALMOLOGY

## 2023-11-01 ASSESSMENT — REFRACTION_AUTOREFRACTION
OD_AXIS: 007
OS_SPHERE: 0.00
OD_CYLINDER: +0.75
OS_CYLINDER: +0.25
OS_AXIS: 106
OD_SPHERE: +1.50

## 2023-11-01 ASSESSMENT — REFRACTION_CURRENTRX
OS_SPHERE: +0.50
OS_ADD: +2.75
OS_AXIS: 027
OD_SPHERE: +0.25
OD_ADD: +2.75
OD_AXIS: 175
OS_OVR_VA: 20/
OD_CYLINDER: +1.00
OS_CYLINDER: +0.75
OD_OVR_VA: 20/

## 2023-11-01 ASSESSMENT — REFRACTION_MANIFEST
OS_ADD: +2.75
OD_SPHERE: +0.25
OD_ADD: +2.75
OS_AXIS: 027
OD_AXIS: 175
OS_SPHERE: +0.50
OD_CYLINDER: +1.00
OS_CYLINDER: +0.75

## 2023-11-01 ASSESSMENT — CONFRONTATIONAL VISUAL FIELD TEST (CVF)
OS_FINDINGS: FULL
OD_FINDINGS: FULL

## 2023-11-01 ASSESSMENT — SUPERFICIAL PUNCTATE KERATITIS (SPK)
OD_SPK: 1+
OS_SPK: 1+

## 2023-11-01 ASSESSMENT — SPHEQUIV_DERIVED
OS_SPHEQUIV: 0.875
OD_SPHEQUIV: 1.875
OD_SPHEQUIV: 0.75
OS_SPHEQUIV: 0.125

## 2023-11-10 ENCOUNTER — OFFICE (OUTPATIENT)
Dept: URBAN - METROPOLITAN AREA CLINIC 121 | Facility: CLINIC | Age: 63
Setting detail: OPHTHALMOLOGY
End: 2023-11-10
Payer: COMMERCIAL

## 2023-11-10 DIAGNOSIS — H40.1122: ICD-10-CM

## 2023-11-10 DIAGNOSIS — H16.223: ICD-10-CM

## 2023-11-10 DIAGNOSIS — B08.1: ICD-10-CM

## 2023-11-10 DIAGNOSIS — Z96.1: ICD-10-CM

## 2023-11-10 DIAGNOSIS — H43.393: ICD-10-CM

## 2023-11-10 DIAGNOSIS — H40.1113: ICD-10-CM

## 2023-11-10 DIAGNOSIS — H40.033: ICD-10-CM

## 2023-11-10 DIAGNOSIS — H35.033: ICD-10-CM

## 2023-11-10 PROCEDURE — 99024 POSTOP FOLLOW-UP VISIT: CPT | Performed by: OPHTHALMOLOGY

## 2023-11-10 ASSESSMENT — SPHEQUIV_DERIVED
OD_SPHEQUIV: -0.125
OS_SPHEQUIV: -0.375
OS_SPHEQUIV: 0.875
OD_SPHEQUIV: 0.75

## 2023-11-10 ASSESSMENT — REFRACTION_CURRENTRX
OS_SPHERE: +0.50
OD_CYLINDER: +1.00
OS_AXIS: 027
OD_ADD: +2.75
OS_CYLINDER: +0.75
OD_OVR_VA: 20/
OS_OVR_VA: 20/
OD_SPHERE: +0.25
OS_ADD: +2.75
OD_AXIS: 175

## 2023-11-10 ASSESSMENT — REFRACTION_MANIFEST
OS_CYLINDER: +0.75
OD_ADD: +2.75
OS_ADD: +2.75
OS_AXIS: 027
OS_SPHERE: +0.50
OD_CYLINDER: +1.00
OD_AXIS: 175
OD_SPHERE: +0.25

## 2023-11-10 ASSESSMENT — SUPERFICIAL PUNCTATE KERATITIS (SPK)
OD_SPK: 1+
OS_SPK: 1+

## 2023-11-10 ASSESSMENT — REFRACTION_AUTOREFRACTION
OS_AXIS: 102
OD_CYLINDER: +0.75
OS_CYLINDER: +0.75
OD_AXIS: 180
OS_SPHERE: -0.75
OD_SPHERE: -0.50

## 2023-11-10 ASSESSMENT — CONFRONTATIONAL VISUAL FIELD TEST (CVF)
OS_FINDINGS: FULL
OD_FINDINGS: FULL

## 2023-12-15 ENCOUNTER — OFFICE (OUTPATIENT)
Dept: URBAN - METROPOLITAN AREA CLINIC 121 | Facility: CLINIC | Age: 63
Setting detail: OPHTHALMOLOGY
End: 2023-12-15
Payer: COMMERCIAL

## 2023-12-15 DIAGNOSIS — Z96.1: ICD-10-CM

## 2023-12-15 DIAGNOSIS — H43.393: ICD-10-CM

## 2023-12-15 DIAGNOSIS — B08.1: ICD-10-CM

## 2023-12-15 DIAGNOSIS — H35.033: ICD-10-CM

## 2023-12-15 DIAGNOSIS — H43.813: ICD-10-CM

## 2023-12-15 DIAGNOSIS — H40.033: ICD-10-CM

## 2023-12-15 DIAGNOSIS — H40.1122: ICD-10-CM

## 2023-12-15 DIAGNOSIS — H16.223: ICD-10-CM

## 2023-12-15 DIAGNOSIS — H40.1113: ICD-10-CM

## 2023-12-15 PROCEDURE — 99024 POSTOP FOLLOW-UP VISIT: CPT | Performed by: OPHTHALMOLOGY

## 2023-12-15 ASSESSMENT — REFRACTION_CURRENTRX
OS_AXIS: 027
OS_ADD: +2.75
OD_CYLINDER: +1.00
OS_SPHERE: +0.50
OD_AXIS: 175
OS_OVR_VA: 20/
OS_CYLINDER: +0.75
OD_OVR_VA: 20/
OD_SPHERE: +0.25
OD_ADD: +2.75

## 2023-12-15 ASSESSMENT — CONFRONTATIONAL VISUAL FIELD TEST (CVF)
OD_FINDINGS: FULL
OS_FINDINGS: FULL

## 2023-12-15 ASSESSMENT — SUPERFICIAL PUNCTATE KERATITIS (SPK)
OS_SPK: 1+
OD_SPK: 1+

## 2023-12-15 ASSESSMENT — SPHEQUIV_DERIVED
OS_SPHEQUIV: 0.875
OD_SPHEQUIV: -0.125
OS_SPHEQUIV: -0.375
OD_SPHEQUIV: 0.75

## 2023-12-15 ASSESSMENT — REFRACTION_AUTOREFRACTION
OS_CYLINDER: +0.75
OS_SPHERE: -0.75
OD_SPHERE: -0.50
OS_AXIS: 102
OD_CYLINDER: +0.75
OD_AXIS: 180

## 2023-12-15 ASSESSMENT — REFRACTION_MANIFEST
OD_ADD: +2.75
OS_SPHERE: +0.50
OS_ADD: +2.75
OD_CYLINDER: +1.00
OS_AXIS: 027
OD_SPHERE: +0.25
OD_AXIS: 175
OS_CYLINDER: +0.75

## 2024-04-15 ENCOUNTER — OFFICE (OUTPATIENT)
Dept: URBAN - METROPOLITAN AREA CLINIC 121 | Facility: CLINIC | Age: 64
Setting detail: OPHTHALMOLOGY
End: 2024-04-15
Payer: COMMERCIAL

## 2024-04-15 DIAGNOSIS — H40.1113: ICD-10-CM

## 2024-04-15 DIAGNOSIS — H40.1122: ICD-10-CM

## 2024-04-15 PROCEDURE — 99212 OFFICE O/P EST SF 10 MIN: CPT | Performed by: OPHTHALMOLOGY

## 2024-04-15 PROCEDURE — 92083 EXTENDED VISUAL FIELD XM: CPT | Performed by: OPHTHALMOLOGY

## 2024-04-15 PROCEDURE — 92133 CPTRZD OPH DX IMG PST SGM ON: CPT | Performed by: OPHTHALMOLOGY

## 2024-08-01 ENCOUNTER — HOSPITAL ENCOUNTER (EMERGENCY)
Dept: HOSPITAL 74 - JERFT | Age: 64
Discharge: HOME | End: 2024-08-01
Payer: COMMERCIAL

## 2024-08-01 VITALS
TEMPERATURE: 98.4 F | RESPIRATION RATE: 17 BRPM | DIASTOLIC BLOOD PRESSURE: 73 MMHG | HEART RATE: 81 BPM | SYSTOLIC BLOOD PRESSURE: 135 MMHG

## 2024-08-01 VITALS — BODY MASS INDEX: 28 KG/M2
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
## 2024-08-01 DIAGNOSIS — S16.1XXA: Primary | ICD-10-CM

## 2024-08-01 DIAGNOSIS — M62.838: ICD-10-CM

## 2024-08-01 RX ADMIN — LIDOCAINE PATCH 4% ONE PATCH: 40 PATCH TOPICAL at 17:20

## 2024-08-01 RX ADMIN — ACETAMINOPHEN ONE MG: 500 TABLET, FILM COATED ORAL at 17:20

## 2024-08-07 ENCOUNTER — OFFICE (OUTPATIENT)
Dept: URBAN - METROPOLITAN AREA CLINIC 121 | Facility: CLINIC | Age: 64
Setting detail: OPHTHALMOLOGY
End: 2024-08-07
Payer: COMMERCIAL

## 2024-08-07 DIAGNOSIS — H40.1113: ICD-10-CM

## 2024-08-07 DIAGNOSIS — H16.223: ICD-10-CM

## 2024-08-07 DIAGNOSIS — B08.1: ICD-10-CM

## 2024-08-07 DIAGNOSIS — H43.813: ICD-10-CM

## 2024-08-07 DIAGNOSIS — Z96.1: ICD-10-CM

## 2024-08-07 DIAGNOSIS — H40.1122: ICD-10-CM

## 2024-08-07 DIAGNOSIS — H35.033: ICD-10-CM

## 2024-08-07 DIAGNOSIS — H43.393: ICD-10-CM

## 2024-08-07 DIAGNOSIS — H26.493: ICD-10-CM

## 2024-08-07 DIAGNOSIS — H40.033: ICD-10-CM

## 2024-08-07 PROCEDURE — 92132 CPTRZD OPH DX IMG ANT SGM: CPT | Performed by: OPHTHALMOLOGY

## 2024-08-07 PROCEDURE — 99213 OFFICE O/P EST LOW 20 MIN: CPT | Performed by: OPHTHALMOLOGY

## 2024-08-07 ASSESSMENT — CONFRONTATIONAL VISUAL FIELD TEST (CVF)
OS_FINDINGS: FULL
OD_FINDINGS: FULL

## 2025-02-17 ENCOUNTER — OFFICE (OUTPATIENT)
Facility: LOCATION | Age: 65
Setting detail: OPHTHALMOLOGY
End: 2025-02-17
Payer: COMMERCIAL

## 2025-02-17 DIAGNOSIS — H43.813: ICD-10-CM

## 2025-02-17 DIAGNOSIS — Z96.1: ICD-10-CM

## 2025-02-17 DIAGNOSIS — H40.1122: ICD-10-CM

## 2025-02-17 DIAGNOSIS — H35.033: ICD-10-CM

## 2025-02-17 DIAGNOSIS — H40.1113: ICD-10-CM

## 2025-02-17 DIAGNOSIS — B08.1: ICD-10-CM

## 2025-02-17 DIAGNOSIS — H43.393: ICD-10-CM

## 2025-02-17 DIAGNOSIS — H16.223: ICD-10-CM

## 2025-02-17 DIAGNOSIS — H26.493: ICD-10-CM

## 2025-02-17 DIAGNOSIS — H40.033: ICD-10-CM

## 2025-02-17 PROCEDURE — 92250 FUNDUS PHOTOGRAPHY W/I&R: CPT | Performed by: OPHTHALMOLOGY

## 2025-02-17 PROCEDURE — 99214 OFFICE O/P EST MOD 30 MIN: CPT | Performed by: OPHTHALMOLOGY

## 2025-02-17 ASSESSMENT — REFRACTION_AUTOREFRACTION
OS_AXIS: 177
OD_CYLINDER: +0.75
OD_SPHERE: 0.00
OS_SPHERE: +0.50
OS_CYLINDER: +0.25
OD_AXIS: 041

## 2025-02-17 ASSESSMENT — VISUAL ACUITY
OS_BCVA: 20/25
OD_BCVA: 20/25

## 2025-02-17 ASSESSMENT — REFRACTION_CURRENTRX
OD_OVR_VA: 20/
OS_SPHERE: +0.50
OS_AXIS: 027
OD_SPHERE: +0.25
OD_CYLINDER: +1.00
OS_CYLINDER: +0.75
OD_AXIS: 175
OS_ADD: +2.75
OD_ADD: +2.75
OS_OVR_VA: 20/

## 2025-02-17 ASSESSMENT — KERATOMETRY
OS_AXISANGLE_DEGREES: 090
METHOD_AUTO_MANUAL: AUTO
OD_AXISANGLE_DEGREES: 071
OD_K2POWER_DIOPTERS: 43.00
OD_K1POWER_DIOPTERS: 42.75
OS_K1POWER_DIOPTERS: 43.00
OS_K2POWER_DIOPTERS: 43.00

## 2025-02-17 ASSESSMENT — SUPERFICIAL PUNCTATE KERATITIS (SPK)
OD_SPK: T
OS_SPK: 2+

## 2025-02-17 ASSESSMENT — PACHYMETRY
OD_CT_UM: 558
OD_CT_CORRECTION: -1
OS_CT_CORRECTION: -1
OS_CT_UM: 550

## 2025-02-17 ASSESSMENT — REFRACTION_MANIFEST
OD_CYLINDER: +1.00
OS_ADD: +2.75
OS_SPHERE: +0.50
OD_AXIS: 175
OD_ADD: +2.75
OS_AXIS: 027
OS_CYLINDER: +0.75
OD_SPHERE: +0.25

## 2025-02-17 ASSESSMENT — CONFRONTATIONAL VISUAL FIELD TEST (CVF)
OS_FINDINGS: FULL
OD_FINDINGS: FULL

## 2025-02-17 ASSESSMENT — TONOMETRY
OS_IOP_MMHG: 18
OD_IOP_MMHG: 14

## 2025-08-01 ENCOUNTER — OFFICE (OUTPATIENT)
Facility: LOCATION | Age: 65
Setting detail: OPHTHALMOLOGY
End: 2025-08-01
Payer: COMMERCIAL

## 2025-08-01 DIAGNOSIS — B08.1: ICD-10-CM

## 2025-08-01 DIAGNOSIS — H40.033: ICD-10-CM

## 2025-08-01 DIAGNOSIS — H40.1122: ICD-10-CM

## 2025-08-01 DIAGNOSIS — H35.033: ICD-10-CM

## 2025-08-01 DIAGNOSIS — H40.1113: ICD-10-CM

## 2025-08-01 DIAGNOSIS — Z96.1: ICD-10-CM

## 2025-08-01 DIAGNOSIS — H26.493: ICD-10-CM

## 2025-08-01 DIAGNOSIS — H16.223: ICD-10-CM

## 2025-08-01 DIAGNOSIS — H43.813: ICD-10-CM

## 2025-08-01 DIAGNOSIS — H43.393: ICD-10-CM

## 2025-08-01 PROCEDURE — 92133 CPTRZD OPH DX IMG PST SGM ON: CPT | Performed by: OPHTHALMOLOGY

## 2025-08-01 PROCEDURE — 92012 INTRM OPH EXAM EST PATIENT: CPT | Performed by: OPHTHALMOLOGY

## 2025-08-01 PROCEDURE — 92083 EXTENDED VISUAL FIELD XM: CPT | Performed by: OPHTHALMOLOGY

## 2025-08-01 ASSESSMENT — KERATOMETRY
OD_AXISANGLE_DEGREES: 071
METHOD_AUTO_MANUAL: AUTO
OD_K2POWER_DIOPTERS: 43.00
OS_K1POWER_DIOPTERS: 43.00
OS_K2POWER_DIOPTERS: 43.00
OS_AXISANGLE_DEGREES: 090
OD_K1POWER_DIOPTERS: 42.75

## 2025-08-01 ASSESSMENT — REFRACTION_AUTOREFRACTION
OD_AXIS: 041
OD_SPHERE: 0.00
OS_AXIS: 177
OS_SPHERE: +0.50
OD_CYLINDER: +0.75
OS_CYLINDER: +0.25

## 2025-08-01 ASSESSMENT — REFRACTION_CURRENTRX
OS_ADD: +2.75
OD_CYLINDER: +1.00
OD_ADD: +2.75
OD_AXIS: 175
OS_OVR_VA: 20/
OS_SPHERE: +0.50
OD_SPHERE: +0.25
OS_AXIS: 027
OD_OVR_VA: 20/
OS_CYLINDER: +0.75

## 2025-08-01 ASSESSMENT — CONFRONTATIONAL VISUAL FIELD TEST (CVF)
OD_FINDINGS: FULL
OS_FINDINGS: FULL

## 2025-08-01 ASSESSMENT — VISUAL ACUITY
OD_BCVA: 20/25
OS_BCVA: 20/25

## 2025-08-01 ASSESSMENT — REFRACTION_MANIFEST
OD_ADD: +2.75
OD_AXIS: 175
OD_CYLINDER: +1.00
OS_SPHERE: +0.50
OS_CYLINDER: +0.75
OS_ADD: +2.75
OS_AXIS: 027
OD_SPHERE: +0.25

## 2025-08-01 ASSESSMENT — SUPERFICIAL PUNCTATE KERATITIS (SPK)
OS_SPK: 2+
OD_SPK: T